# Patient Record
Sex: FEMALE | Race: WHITE | NOT HISPANIC OR LATINO | Employment: FULL TIME | URBAN - METROPOLITAN AREA
[De-identification: names, ages, dates, MRNs, and addresses within clinical notes are randomized per-mention and may not be internally consistent; named-entity substitution may affect disease eponyms.]

---

## 2019-02-15 ENCOUNTER — HOSPITAL ENCOUNTER (OUTPATIENT)
Facility: MEDICAL CENTER | Age: 49
End: 2019-02-15
Attending: PREVENTIVE MEDICINE
Payer: COMMERCIAL

## 2019-02-15 ENCOUNTER — EH NON-PROVIDER (OUTPATIENT)
Dept: OCCUPATIONAL MEDICINE | Facility: CLINIC | Age: 49
End: 2019-02-15

## 2019-02-15 ENCOUNTER — EMPLOYEE HEALTH (OUTPATIENT)
Dept: OCCUPATIONAL MEDICINE | Facility: CLINIC | Age: 49
End: 2019-02-15

## 2019-02-15 DIAGNOSIS — Z02.89 VISIT FOR OCCUPATIONAL HEALTH EXAMINATION: ICD-10-CM

## 2019-02-15 DIAGNOSIS — Z02.89 EXAMINATION, PHYSICAL, EMPLOYEE: ICD-10-CM

## 2019-02-15 DIAGNOSIS — Z02.89 VISIT FOR OCCUPATIONAL HEALTH EXAMINATION: Primary | ICD-10-CM

## 2019-02-15 LAB — VZV IGG SER IA-ACNC: 1.04

## 2019-02-15 PROCEDURE — 86480 TB TEST CELL IMMUN MEASURE: CPT | Performed by: PREVENTIVE MEDICINE

## 2019-02-15 PROCEDURE — 90707 MMR VACCINE SC: CPT | Performed by: PREVENTIVE MEDICINE

## 2019-02-15 PROCEDURE — 8915 PR COMPREHENSIVE PHYSICAL: Performed by: INTERNAL MEDICINE

## 2019-02-15 PROCEDURE — 86787 VARICELLA-ZOSTER ANTIBODY: CPT | Performed by: PREVENTIVE MEDICINE

## 2019-02-15 PROCEDURE — 94375 RESPIRATORY FLOW VOLUME LOOP: CPT | Performed by: PREVENTIVE MEDICINE

## 2019-02-15 PROCEDURE — 90715 TDAP VACCINE 7 YRS/> IM: CPT | Performed by: PREVENTIVE MEDICINE

## 2019-02-15 PROCEDURE — 94010 BREATHING CAPACITY TEST: CPT | Performed by: PREVENTIVE MEDICINE

## 2019-02-15 PROCEDURE — 90686 IIV4 VACC NO PRSV 0.5 ML IM: CPT | Performed by: PREVENTIVE MEDICINE

## 2019-02-15 NOTE — ADDENDUM NOTE
Addended by: WINDY WOODWARD on: 2/15/2019 10:08 AM     Modules accepted: Orders, Level of Service, SmartSet

## 2019-02-17 LAB
GAMMA INTERFERON BACKGROUND BLD IA-ACNC: 0.02 IU/ML
M TB IFN-G BLD-IMP: NEGATIVE
M TB IFN-G CD4+ BCKGRND COR BLD-ACNC: 0 IU/ML
MITOGEN IGNF BCKGRD COR BLD-ACNC: >10 IU/ML
QFT TB2 - NIL TBQ2: 0 IU/ML

## 2019-02-22 ENCOUNTER — EH NON-PROVIDER (OUTPATIENT)
Dept: OCCUPATIONAL MEDICINE | Facility: CLINIC | Age: 49
End: 2019-02-22

## 2019-02-22 DIAGNOSIS — Z71.85 IMMUNIZATION COUNSELING: ICD-10-CM

## 2019-10-23 ENCOUNTER — OFFICE VISIT (OUTPATIENT)
Dept: MEDICAL GROUP | Facility: MEDICAL CENTER | Age: 49
End: 2019-10-23
Payer: COMMERCIAL

## 2019-10-23 VITALS
OXYGEN SATURATION: 94 % | WEIGHT: 150 LBS | BODY MASS INDEX: 26.58 KG/M2 | SYSTOLIC BLOOD PRESSURE: 114 MMHG | HEART RATE: 92 BPM | RESPIRATION RATE: 16 BRPM | TEMPERATURE: 97.9 F | DIASTOLIC BLOOD PRESSURE: 80 MMHG | HEIGHT: 63 IN

## 2019-10-23 DIAGNOSIS — E78.6 LOW HDL (UNDER 40): ICD-10-CM

## 2019-10-23 DIAGNOSIS — Z12.39 SCREENING FOR BREAST CANCER: ICD-10-CM

## 2019-10-23 DIAGNOSIS — J45.40 MODERATE PERSISTENT ASTHMA WITHOUT COMPLICATION: ICD-10-CM

## 2019-10-23 DIAGNOSIS — Z13.29 THYROID DISORDER SCREEN: ICD-10-CM

## 2019-10-23 DIAGNOSIS — K21.9 GASTROESOPHAGEAL REFLUX DISEASE WITHOUT ESOPHAGITIS: ICD-10-CM

## 2019-10-23 PROCEDURE — 99203 OFFICE O/P NEW LOW 30 MIN: CPT | Performed by: NURSE PRACTITIONER

## 2019-10-23 RX ORDER — CETIRIZINE HYDROCHLORIDE 10 MG/1
10 TABLET ORAL EVERY EVENING
COMMUNITY

## 2019-10-23 RX ORDER — MONTELUKAST SODIUM 10 MG/1
10 TABLET ORAL DAILY
Qty: 90 TAB | Refills: 3 | Status: SHIPPED | OUTPATIENT
Start: 2019-10-23 | End: 2020-12-29 | Stop reason: SDUPTHER

## 2019-10-23 RX ORDER — MONTELUKAST SODIUM 10 MG/1
10 TABLET ORAL DAILY
COMMUNITY
End: 2019-10-23 | Stop reason: SDUPTHER

## 2019-10-23 SDOH — HEALTH STABILITY: MENTAL HEALTH: HOW OFTEN DO YOU HAVE A DRINK CONTAINING ALCOHOL?: NEVER

## 2019-10-23 ASSESSMENT — PATIENT HEALTH QUESTIONNAIRE - PHQ9: CLINICAL INTERPRETATION OF PHQ2 SCORE: 0

## 2019-10-23 NOTE — ASSESSMENT & PLAN NOTE
"\"since childhood\"   Had been prescribed omeprazole at one point, not taking regularly.  She did have dysphagia and worsening GERD when pregnant, had a scope about 13 years ago which was reportedly normal  She had an unofficial swallow study at one point about 10 years ago which did show motility abnormalities (works as RN)  Now still having regular symptoms as well as hoarse voice  She is taking nexium as needed, tums if still having symptoms.  She would like to follow-up with gastroenterology.  No chronic cough, nausea, dysphasia, abdominal pain  "
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
## 2019-11-15 ENCOUNTER — APPOINTMENT (RX ONLY)
Dept: URBAN - METROPOLITAN AREA CLINIC 20 | Facility: CLINIC | Age: 49
Setting detail: DERMATOLOGY
End: 2019-11-15

## 2019-11-15 DIAGNOSIS — Z71.89 OTHER SPECIFIED COUNSELING: ICD-10-CM

## 2019-11-15 DIAGNOSIS — L82.1 OTHER SEBORRHEIC KERATOSIS: ICD-10-CM

## 2019-11-15 DIAGNOSIS — L81.4 OTHER MELANIN HYPERPIGMENTATION: ICD-10-CM

## 2019-11-15 DIAGNOSIS — D22 MELANOCYTIC NEVI: ICD-10-CM

## 2019-11-15 DIAGNOSIS — D18.0 HEMANGIOMA: ICD-10-CM

## 2019-11-15 DIAGNOSIS — L73.8 OTHER SPECIFIED FOLLICULAR DISORDERS: ICD-10-CM

## 2019-11-15 DIAGNOSIS — L85.3 XEROSIS CUTIS: ICD-10-CM

## 2019-11-15 PROBLEM — D22.61 MELANOCYTIC NEVI OF RIGHT UPPER LIMB, INCLUDING SHOULDER: Status: ACTIVE | Noted: 2019-11-15

## 2019-11-15 PROBLEM — D22.39 MELANOCYTIC NEVI OF OTHER PARTS OF FACE: Status: ACTIVE | Noted: 2019-11-15

## 2019-11-15 PROBLEM — D18.01 HEMANGIOMA OF SKIN AND SUBCUTANEOUS TISSUE: Status: ACTIVE | Noted: 2019-11-15

## 2019-11-15 PROBLEM — D22.71 MELANOCYTIC NEVI OF RIGHT LOWER LIMB, INCLUDING HIP: Status: ACTIVE | Noted: 2019-11-15

## 2019-11-15 PROBLEM — D22.5 MELANOCYTIC NEVI OF TRUNK: Status: ACTIVE | Noted: 2019-11-15

## 2019-11-15 PROBLEM — D22.62 MELANOCYTIC NEVI OF LEFT UPPER LIMB, INCLUDING SHOULDER: Status: ACTIVE | Noted: 2019-11-15

## 2019-11-15 PROBLEM — J45.909 UNSPECIFIED ASTHMA, UNCOMPLICATED: Status: ACTIVE | Noted: 2019-11-15

## 2019-11-15 PROBLEM — D22.72 MELANOCYTIC NEVI OF LEFT LOWER LIMB, INCLUDING HIP: Status: ACTIVE | Noted: 2019-11-15

## 2019-11-15 PROCEDURE — ? SUNSCREEN RECOMMENDATIONS

## 2019-11-15 PROCEDURE — 99203 OFFICE O/P NEW LOW 30 MIN: CPT

## 2019-11-15 PROCEDURE — ? RECOMMENDATIONS

## 2019-11-15 PROCEDURE — ? COUNSELING

## 2019-11-15 PROCEDURE — ? OBSERVATION AND MEASURE

## 2019-11-15 ASSESSMENT — LOCATION DETAILED DESCRIPTION DERM
LOCATION DETAILED: LEFT DISTAL POSTERIOR THIGH
LOCATION DETAILED: LEFT SUPERIOR UPPER BACK
LOCATION DETAILED: RIGHT INFERIOR VERMILION LIP
LOCATION DETAILED: RIGHT INFERIOR VERMILION LIP
LOCATION DETAILED: LEFT VENTRAL PROXIMAL FOREARM
LOCATION DETAILED: RIGHT DISTAL POSTERIOR UPPER ARM
LOCATION DETAILED: RIGHT SUPERIOR UPPER BACK
LOCATION DETAILED: RIGHT DISTAL POSTERIOR THIGH
LOCATION DETAILED: RIGHT INFERIOR MEDIAL MIDBACK
LOCATION DETAILED: RIGHT VENTRAL PROXIMAL FOREARM
LOCATION DETAILED: RIGHT PROXIMAL PRETIBIAL REGION
LOCATION DETAILED: RIGHT INFERIOR MEDIAL UPPER BACK
LOCATION DETAILED: RIGHT INFERIOR FOREHEAD
LOCATION DETAILED: INFERIOR THORACIC SPINE
LOCATION DETAILED: LEFT MEDIAL UPPER BACK
LOCATION DETAILED: LEFT PROXIMAL POSTERIOR UPPER ARM
LOCATION DETAILED: LEFT LATERAL PROXIMAL PRETIBIAL REGION
LOCATION DETAILED: RIGHT INFERIOR CENTRAL MALAR CHEEK

## 2019-11-15 ASSESSMENT — LOCATION ZONE DERM
LOCATION ZONE: LIP
LOCATION ZONE: FACE
LOCATION ZONE: ARM
LOCATION ZONE: LIP
LOCATION ZONE: TRUNK
LOCATION ZONE: LEG

## 2019-11-15 ASSESSMENT — LOCATION SIMPLE DESCRIPTION DERM
LOCATION SIMPLE: UPPER BACK
LOCATION SIMPLE: LEFT FOREARM
LOCATION SIMPLE: RIGHT POSTERIOR THIGH
LOCATION SIMPLE: LEFT POSTERIOR THIGH
LOCATION SIMPLE: RIGHT LOWER BACK
LOCATION SIMPLE: RIGHT INFERIOR LIP
LOCATION SIMPLE: LEFT UPPER BACK
LOCATION SIMPLE: RIGHT INFERIOR LIP
LOCATION SIMPLE: RIGHT FOREARM
LOCATION SIMPLE: RIGHT CHEEK
LOCATION SIMPLE: LEFT POSTERIOR UPPER ARM
LOCATION SIMPLE: RIGHT POSTERIOR UPPER ARM
LOCATION SIMPLE: RIGHT FOREHEAD
LOCATION SIMPLE: LEFT PRETIBIAL REGION
LOCATION SIMPLE: RIGHT PRETIBIAL REGION
LOCATION SIMPLE: RIGHT UPPER BACK

## 2019-12-12 ENCOUNTER — HOSPITAL ENCOUNTER (OUTPATIENT)
Dept: RADIOLOGY | Facility: MEDICAL CENTER | Age: 49
End: 2019-12-12
Attending: NURSE PRACTITIONER
Payer: COMMERCIAL

## 2019-12-12 DIAGNOSIS — Z12.39 SCREENING FOR BREAST CANCER: ICD-10-CM

## 2019-12-12 PROCEDURE — 77067 SCR MAMMO BI INCL CAD: CPT

## 2020-03-11 ENCOUNTER — TELEPHONE (OUTPATIENT)
Dept: MEDICAL GROUP | Facility: MEDICAL CENTER | Age: 50
End: 2020-03-11

## 2020-03-11 DIAGNOSIS — J45.40 MODERATE PERSISTENT ASTHMA WITHOUT COMPLICATION: ICD-10-CM

## 2020-03-11 RX ORDER — ALBUTEROL SULFATE 90 UG/1
2 AEROSOL, METERED RESPIRATORY (INHALATION) EVERY 4 HOURS PRN
Qty: 1 INHALER | Refills: 5 | Status: SHIPPED | OUTPATIENT
Start: 2020-03-11 | End: 2020-03-17 | Stop reason: SDUPTHER

## 2020-03-11 NOTE — TELEPHONE ENCOUNTER
1. Caller Name: Jennifer Benton                            Call Back Number: [phone] (home)           How would the patient prefer to be contacted with a response: [employer] message    2. SPECIFIC Action To Be Taken: Referral pending, please sign.    3. Diagnosis/Clinical Reason for Request:     Patient request / for asthma     (patient used to see someone in [facility] and would like someone new in the area now)    4. Specialty & Provider Name/Lab/Imaging Location:     Pulmonology     5. Is appointment scheduled for requested order/referral: no    Patient was not informed they will receive a return phone call from the office ONLY if there are any questions before processing their request. Advised to call back if they haven't received a call from the referral department in 5 days.

## 2020-03-11 NOTE — TELEPHONE ENCOUNTER
Patient would like a prescription for PRO AIR for her asthma, I am not seeing this in the patient chart. Please review.      Received request via: Patient    Was the patient seen in the last year in this department? Yes    Does the patient have an active prescription (recently filled or refills available) for medication(s) requested? No

## 2020-03-17 ENCOUNTER — OFFICE VISIT (OUTPATIENT)
Dept: PULMONOLOGY | Facility: HOSPICE | Age: 50
End: 2020-03-17
Payer: COMMERCIAL

## 2020-03-17 VITALS
WEIGHT: 150 LBS | DIASTOLIC BLOOD PRESSURE: 62 MMHG | HEART RATE: 90 BPM | HEIGHT: 63 IN | OXYGEN SATURATION: 94 % | SYSTOLIC BLOOD PRESSURE: 126 MMHG | BODY MASS INDEX: 26.58 KG/M2

## 2020-03-17 DIAGNOSIS — K21.9 GASTROESOPHAGEAL REFLUX DISEASE WITHOUT ESOPHAGITIS: ICD-10-CM

## 2020-03-17 DIAGNOSIS — Z91.09 ENVIRONMENTAL ALLERGIES: ICD-10-CM

## 2020-03-17 DIAGNOSIS — J45.50 SEVERE PERSISTENT ASTHMA WITHOUT COMPLICATION: ICD-10-CM

## 2020-03-17 DIAGNOSIS — Z78.9 NONSMOKER: ICD-10-CM

## 2020-03-17 PROCEDURE — 99204 OFFICE O/P NEW MOD 45 MIN: CPT | Performed by: INTERNAL MEDICINE

## 2020-03-17 RX ORDER — PREDNISONE 10 MG/1
TABLET ORAL
Qty: 18 TAB | Refills: 0 | Status: SHIPPED | OUTPATIENT
Start: 2020-03-17 | End: 2020-12-10

## 2020-03-17 RX ORDER — IPRATROPIUM BROMIDE AND ALBUTEROL SULFATE 2.5; .5 MG/3ML; MG/3ML
3 SOLUTION RESPIRATORY (INHALATION) 4 TIMES DAILY
Qty: 60 BULLET | Refills: 3 | Status: SHIPPED | OUTPATIENT
Start: 2020-03-17 | End: 2021-04-15

## 2020-03-17 RX ORDER — ALBUTEROL SULFATE 90 UG/1
2 AEROSOL, METERED RESPIRATORY (INHALATION) EVERY 4 HOURS PRN
Qty: 1 INHALER | Refills: 5 | Status: SHIPPED | OUTPATIENT
Start: 2020-03-17 | End: 2021-03-02 | Stop reason: SDUPTHER

## 2020-03-17 NOTE — LETTER
March 17, 2020      To whom it may concern:      Re: Jennifer Benton is advised to maintain social distancing due to her pulmonary condition.  Consequently, she is advised to avoid any groups of greater than 10 people. If this cannot be accommodated at work then she is recommended to stay at home.                  Sincerely,        Dasia Arevalo M.D.
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
## 2020-03-24 ENCOUNTER — TELEPHONE (OUTPATIENT)
Dept: PULMONOLOGY | Facility: HOSPICE | Age: 50
End: 2020-03-24

## 2020-03-24 NOTE — TELEPHONE ENCOUNTER
Updated patient regarding medical equipment referral to [facility]; gave pt Sukhjinder phone#

## 2020-03-24 NOTE — TELEPHONE ENCOUNTER
1. Caller Name: Jennifer                        Call Back Number: [phone]  [facility] PCP or Specialty Provider: Yes         2.  Does patient have any active symptoms of respiratory illness (fever OR cough OR shortness of breath OR sore throat)? Yes, the patient reports the following respiratory symptoms: cough.  Denies fever    3.  Does patient have any comoribidities? None Asthma    4.  Has the patient traveled in the last 14 days OR had any known contact with someone who is suspected or confirmed to have COVID-19?  No.    5. Disposition: Advised to perform self care, monitor for worsening symptoms and to call back in 3 days if no improvement Pt works at [employer], was told yesterday that an unknown coworker tested positive for COVID19.  Was seen in clinic by Dr Arevalo on 3/17 and reports vigilant social distancing after that appointment.  Pt denies any fever, chills, or recent travel; reports typical sob and chest tightness associated with her lifelong asthma diagnosis; finishing prednisone taper today, reports adequate supply of asthma medications; reports new Rx for nebulizer albuterol however has never being given nebulizer machine, will f/u with that.  Educated patient regarding importance of self isolation, instructed to report to [facility] or [facility] if symptoms worsen and do not respond to prescribed medications; Dr Arevalo updated    Note routed to [facility] Provider: FYI only.

## 2020-03-24 NOTE — TELEPHONE ENCOUNTER
Caller: Jennifer    Phone Number: [phone] (home)     Message:Pt called and l/m. She was recently seen by Dr Arevalo.  She had exposure to the COVID-19 at her workplace.  She is under quarantine.  She would like Dr Arevalo to call her to let her know what she thinks and what she should do since she has asthma.

## 2020-04-28 ENCOUNTER — TELEMEDICINE (OUTPATIENT)
Dept: MEDICAL GROUP | Facility: MEDICAL CENTER | Age: 50
End: 2020-04-28
Payer: COMMERCIAL

## 2020-04-28 VITALS — WEIGHT: 148 LBS | HEIGHT: 62 IN | TEMPERATURE: 97.9 F | BODY MASS INDEX: 27.23 KG/M2

## 2020-04-28 DIAGNOSIS — U07.1 COVID-19 VIRUS DETECTED: ICD-10-CM

## 2020-04-28 PROCEDURE — 99213 OFFICE O/P EST LOW 20 MIN: CPT | Mod: 95,CR | Performed by: NURSE PRACTITIONER

## 2020-04-28 NOTE — PROGRESS NOTES
"Telemedicine Visit: Established Patient     This encounter was conducted via [employer].   Verbal consent was obtained. Patient's identity was verified.    Subjective:   CC: positive COVID 19  Jennifer Benton is a 49 y.o. female presenting for virtual visit to discuss COVID 19 concerns. She was exposed at the workplace ([employer], coworker passed away) in March. Her office was closed as a result and all employees were tested. Her test was positive around 3/24/2020. She had mild increase in asthma symptoms, body aches, congestion which lasted a few days. She states she has been mostly back to \"normal\" and feeling find since 3/31. She has been required to remain out of work until having a negative test. She will be tested tomorrow. She has an open workman's comp case and is seeing occupational health.   Today she is essentially wondering if she needs to do anything else to prevent spread of illness or prepare her for return to work    Current medicines (including changes today)  Current Outpatient Medications   Medication Sig Dispense Refill   • predniSONE (DELTASONE) 10 MG Tab Take 30mg x 3 days, then take 20mg x 3 days, then take 10mg x 3 days, with food, then discontinue. 18 Tab 0   • albuterol 108 (90 Base) MCG/ACT Aero Soln inhalation aerosol Inhale 2 Puffs by mouth every four hours as needed for Shortness of Breath. 1 Inhaler 5   • ipratropium-albuterol (DUONEB) 0.5-2.5 (3) MG/3ML nebulizer solution 3 mL by Nebulization route 4 times a day. 60 Bullet 3   • cetirizine (ZYRTEC ALLERGY) 10 MG Tab Take 10 mg by mouth every day.     • montelukast (SINGULAIR) 10 MG Tab Take 1 Tab by mouth every day. 90 Tab 3   • Mometasone Furo-Formoterol Fum (DULERA) 100-5 MCG/ACT Aerosol Inhale 1 Puff by mouth 2 Times a Day. 3 Inhaler 3     No current facility-administered medications for this visit.        Patient Active Problem List    Diagnosis Date Noted   • Gastroesophageal reflux disease without esophagitis 10/23/2019   • Moderate " "persistent asthma without complication 10/23/2019       Family History   Problem Relation Age of Onset   • Heart Disease Father 48   • Diabetes Son         type 1       She  has a past medical history of Hyperlipidemia.  She  has no past surgical history on file.       Objective:   Vitals obtained by patient:  Vitals:    04/28/20 1121   Temp: 36.6 °C (97.9 °F)     Vitals:    04/28/20 1121   Weight: 67.1 kg (148 lb)   Height: 1.575 m (5' 2\")       Physical Exam:  Constitutional: Alert, no distress, well-groomed.  Skin: No rashes in visible areas.  Eye: Round. Conjunctiva clear, lids normal. No icterus.   ENMT: Lips pink without lesions, good dentition, moist mucous membranes. Phonation normal.  Neck: No masses, no thyromegaly. Moves freely without pain.  Respiratory: Unlabored respiratory effort, no cough or audible wheeze  Psych: Alert and oriented x3, normal affect and mood.       Assessment and Plan:   The following treatment plan was discussed:     1. COVID-19 virus detected  positive test in late March d/t workplace exposure. She did have mild symptoms, now resolved. She needs retesting to return to work and will be doing this tomorrow. She will continue her usual allergy and asthma regimen and will contact me with further concerns      Follow-up: as needed         "

## 2020-10-17 NOTE — PROGRESS NOTES
Please refer to scanned documents     Problem: Respiratory  Intervention: Respiratory assessment  Note: BRONCHOSPASM/BRONCHOCONSTRICTION     [x]         IMPROVE AERATION/BREATH SOUNDS  [x]   ADMINISTER BRONCHODILATOR THERAPY AS APPROPRIATE  [x]   ASSESS BREATH SOUNDS  [x]   IMPLEMENT AEROSOL/MDI PROTOCOL  [x]   PATIENT EDUCATION AS NEEDED     PROVIDE ADEQUATE OXYGENATION WITH ACCEPTABLE SP02/ABG'S    [x]  IDENTIFY APPROPRIATE OXYGEN THERAPY  [x]   MONITOR SP02/ABG'S AS NEEDED   [x]   PATIENT EDUCATION AS NEEDED   NON INVASIVE VENTILATION  PROVIDE OPTIMAL VENTILATION/ACCEPTABLE SP02  IMPLEMENT NON INVASIVE VENTILATION PROTOCOL  ASSESSMENT SKIN INTEGRITY  PATIENT EDUCATION AS NEEDED  BIPAP AS NEEDED

## 2020-12-10 ENCOUNTER — TELEMEDICINE (OUTPATIENT)
Dept: MEDICAL GROUP | Facility: MEDICAL CENTER | Age: 50
End: 2020-12-10

## 2020-12-10 VITALS
DIASTOLIC BLOOD PRESSURE: 98 MMHG | WEIGHT: 145 LBS | HEIGHT: 62 IN | SYSTOLIC BLOOD PRESSURE: 127 MMHG | BODY MASS INDEX: 26.68 KG/M2

## 2020-12-10 DIAGNOSIS — I10 ESSENTIAL HYPERTENSION: ICD-10-CM

## 2020-12-10 PROCEDURE — 99214 OFFICE O/P EST MOD 30 MIN: CPT | Mod: 95,CR | Performed by: NURSE PRACTITIONER

## 2020-12-10 RX ORDER — LOSARTAN POTASSIUM 25 MG/1
25 TABLET ORAL DAILY
Qty: 30 TAB | Refills: 0 | Status: SHIPPED | OUTPATIENT
Start: 2020-12-10 | End: 2021-01-08 | Stop reason: SDUPTHER

## 2020-12-10 NOTE — NON-PROVIDER
Patient monitors bp daily for the past several weeks. She was able to share yesterdays bp of 132/102 on 12/09

## 2020-12-11 NOTE — ASSESSMENT & PLAN NOTE
This is a new issue first brought to her attention by the dentist a few months ago. 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 She is typically in the upper 130's over 90's or low 100's.   No recent weight gain, dietary changes. No tobacco or stimulant use. She is not getting much exercise recently d/t covid 19 pandemic.   She does have family h/o HTN  No frequent headaches, dizziness, palpitations, chest pain

## 2020-12-11 NOTE — PROGRESS NOTES
Telemedicine: Established Patient   This evaluation was conducted via [employer] using secure and encrypted videoconferencing technology. The patient was in a private location in the state of [place].    The patient's identity was confirmed and verbal consent was obtained for this virtual visit.    Subjective:   CC:   Chief Complaint   Patient presents with   • Hypertension     f/v       Jennifer Benton is a 50 y.o. female presenting for evaluation and management of:    Essential hypertension  This is a new issue first brought to her attention by the dentist a few months ago. 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 She is typically in the upper 130's over 90's or low 100's.   No recent weight gain, dietary changes. No tobacco or stimulant use. She is not getting much exercise recently d/t covid 19 pandemic.   She does have family h/o HTN  No frequent headaches, dizziness, palpitations, chest pain        ROS      No Known Allergies    Current medicines (including changes today)  Current Outpatient Medications   Medication Sig Dispense Refill   • losartan (COZAAR) 25 MG Tab Take 1 Tab by mouth every day. 30 Tab 0   • albuterol 108 (90 Base) MCG/ACT Aero Soln inhalation aerosol Inhale 2 Puffs by mouth every four hours as needed for Shortness of Breath. 1 Inhaler 5   • ipratropium-albuterol (DUONEB) 0.5-2.5 (3) MG/3ML nebulizer solution 3 mL by Nebulization route 4 times a day. 60 Bullet 3   • cetirizine (ZYRTEC ALLERGY) 10 MG Tab Take 10 mg by mouth every day.     • montelukast (SINGULAIR) 10 MG Tab Take 1 Tab by mouth every day. 90 Tab 3   • Mometasone Furo-Formoterol Fum (DULERA) 100-5 MCG/ACT Aerosol Inhale 1 Puff by mouth 2 Times a Day. 3 Inhaler 3     No current facility-administered medications for this visit.        Patient Active Problem List    Diagnosis Date Noted   • Essential hypertension 12/10/2020   • Gastroesophageal reflux disease without esophagitis 10/23/2019   •  "Moderate persistent asthma without complication 10/23/2019       Family History   Problem Relation Age of Onset   • Heart Disease Father 48   • Diabetes Son         type 1       She  has a past medical history of Hyperlipidemia.  She  has no past surgical history on file.       Objective:   /98   Ht 1.575 m (5' 2\")   Wt 65.8 kg (145 lb)   BMI 26.52 kg/m²     Physical Exam  Physical Exam:  Constitutional: Alert, no distress, well-groomed.  Skin: No rashes in visible areas.  Eye: Round. Conjunctiva clear, lids normal. No icterus.   ENMT: Lips pink without lesions, good dentition, moist mucous membranes. Phonation normal.  Neck: No masses, no thyromegaly. Moves freely without pain.  CV: Pulse as reported by patient  Respiratory: Unlabored respiratory effort, no cough or audible wheeze  Psych: Alert and oriented x3, normal affect and mood.     Assessment and Plan:   The following treatment plan was discussed:     1. Essential hypertension  - losartan (COZAAR) 25 MG Tab; Take 1 Tab by mouth every day.  Dispense: 30 Tab; Refill: 0  new problems over the last few months. She has been monitor readings and found them to be persistently elevated. We will start losartan. Discussed cardio exercise, DASH diet, continue avoidance of alcohol and tobacco, work on slight weight loss. She will contact me with readings in 1 week      Follow-up: 1 week           "

## 2020-12-29 DIAGNOSIS — J45.40 MODERATE PERSISTENT ASTHMA WITHOUT COMPLICATION: ICD-10-CM

## 2020-12-29 RX ORDER — MONTELUKAST SODIUM 10 MG/1
10 TABLET ORAL DAILY
Qty: 90 TAB | Refills: 3 | Status: SHIPPED | OUTPATIENT
Start: 2020-12-29 | End: 2022-05-10 | Stop reason: SDUPTHER

## 2021-01-04 ENCOUNTER — PATIENT MESSAGE (OUTPATIENT)
Dept: MEDICAL GROUP | Facility: MEDICAL CENTER | Age: 51
End: 2021-01-04

## 2021-01-04 DIAGNOSIS — I10 ESSENTIAL HYPERTENSION: ICD-10-CM

## 2021-01-08 RX ORDER — LOSARTAN POTASSIUM 25 MG/1
25 TABLET ORAL DAILY
Qty: 90 TAB | Refills: 1 | Status: SHIPPED | OUTPATIENT
Start: 2021-01-08 | End: 2021-07-27

## 2021-02-04 DIAGNOSIS — J45.40 MODERATE PERSISTENT ASTHMA WITHOUT COMPLICATION: ICD-10-CM

## 2021-02-04 NOTE — TELEPHONE ENCOUNTER
Received request via: Pharmacy    Was the patient seen in the last year in this department? Yes    Does the patient have an active prescription (recently filled or refills available) for medication(s) requested? No     Requested Prescriptions     Pending Prescriptions Disp Refills   • Mometasone Furo-Formoterol Fum (DULERA) 100-5 MCG/ACT Aerosol       Sig: Inhale 1 Puff 2 Times a Day.

## 2021-02-21 ENCOUNTER — PATIENT MESSAGE (OUTPATIENT)
Dept: MEDICAL GROUP | Facility: MEDICAL CENTER | Age: 51
End: 2021-02-21

## 2021-03-02 ENCOUNTER — TELEMEDICINE (OUTPATIENT)
Dept: MEDICAL GROUP | Facility: MEDICAL CENTER | Age: 51
End: 2021-03-02

## 2021-03-02 VITALS — WEIGHT: 151 LBS | HEIGHT: 62 IN | BODY MASS INDEX: 27.79 KG/M2

## 2021-03-02 DIAGNOSIS — I10 ESSENTIAL HYPERTENSION: ICD-10-CM

## 2021-03-02 DIAGNOSIS — L60.8 ACQUIRED DEFORMITY OF NAIL: ICD-10-CM

## 2021-03-02 DIAGNOSIS — Z12.31 ENCOUNTER FOR SCREENING MAMMOGRAM FOR MALIGNANT NEOPLASM OF BREAST: ICD-10-CM

## 2021-03-02 DIAGNOSIS — J45.50 SEVERE PERSISTENT ASTHMA WITHOUT COMPLICATION: ICD-10-CM

## 2021-03-02 PROCEDURE — 99213 OFFICE O/P EST LOW 20 MIN: CPT | Mod: 95,CR | Performed by: NURSE PRACTITIONER

## 2021-03-02 RX ORDER — HYDROCHLOROTHIAZIDE 12.5 MG/1
12.5 TABLET ORAL DAILY
Qty: 90 TABLET | Refills: 1 | Status: SHIPPED | OUTPATIENT
Start: 2021-03-02 | End: 2021-04-20

## 2021-03-02 RX ORDER — ALBUTEROL SULFATE 90 UG/1
2 AEROSOL, METERED RESPIRATORY (INHALATION) EVERY 4 HOURS PRN
Qty: 1 EACH | Refills: 5 | Status: SHIPPED | OUTPATIENT
Start: 2021-03-02

## 2021-03-02 ASSESSMENT — PATIENT HEALTH QUESTIONNAIRE - PHQ9: CLINICAL INTERPRETATION OF PHQ2 SCORE: 0

## 2021-03-02 NOTE — ASSESSMENT & PLAN NOTE
Stable on current medication regimen, needing refill of her albuterol today.  No increase in cough or shortness of breath

## 2021-03-02 NOTE — PROGRESS NOTES
"Telemedicine: Established Patient   This evaluation was conducted via Zoom using secure and encrypted videoconferencing technology. The patient was in a private location in the state of [place].    The patient's identity was confirmed and verbal consent was obtained for this virtual visit.    Subjective:   CC:   Chief Complaint   Patient presents with   • Follow-Up     BLOOD PRESSURE, FUNGUS ON NAIL       Jennifer Benton is a 50 y.o. female presenting for evaluation and management of:    Essential hypertension  Seen today for virtual visit to follow-up on new prescription for losartan 25 mg daily. Blood pressure readings have improved, now consistently in the 120s systolic. However, diastolic readings remain elevated typically in the low 90s. She recently stopped working night shift will be going back to her job at the [employer]. She is taking medication every morning. The job at the [employer] does tend to be sedentary so she is not sure she is going to get as much activity.  She is watching her diet and sodium intake. No tobacco use, no stimulant use.  No dizziness, fatigue, chest pain    Severe persistent asthma without complication  Stable on current medication regimen, needing refill of her albuterol today.  No increase in cough or shortness of breath    Acquired deformity of nail  Right thumb has developed a \"bubble like\" abnormality in the center of the nail, she questions what may be the cause.  She does not have any associated pain, no nail discoloration, thickening, breaking.  This is difficult to visualize today on her virtual visit.  She states that she does have divets in other nails, this is the only that has formed a lump        ROS      No Known Allergies    Current medicines (including changes today)  Current Outpatient Medications   Medication Sig Dispense Refill   • Esomeprazole Magnesium (NEXIUM) 10 MG Pack      • hydroCHLOROthiazide (HYDRODIURIL) 12.5 MG tablet Take 1 tablet by mouth every day. 90 tablet 1   • " "albuterol 108 (90 Base) MCG/ACT Aero Soln inhalation aerosol Inhale 2 Puffs every four hours as needed for Shortness of Breath. 1 Each 5   • Mometasone Furo-Formoterol Fum (DULERA) 100-5 MCG/ACT Aerosol Inhale 1 Puff 2 Times a Day. 39 g 3   • losartan (COZAAR) 25 MG Tab Take 1 Tab by mouth every day. 90 Tab 1   • montelukast (SINGULAIR) 10 MG Tab Take 1 Tab by mouth every day. 90 Tab 3   • ipratropium-albuterol (DUONEB) 0.5-2.5 (3) MG/3ML nebulizer solution 3 mL by Nebulization route 4 times a day. 60 Bullet 3   • cetirizine (ZYRTEC ALLERGY) 10 MG Tab Take 10 mg by mouth every day.       No current facility-administered medications for this visit.       Patient Active Problem List    Diagnosis Date Noted   • Acquired deformity of nail 03/02/2021   • Essential hypertension 12/10/2020   • Gastroesophageal reflux disease without esophagitis 10/23/2019   • Severe persistent asthma without complication 10/23/2019       Family History   Problem Relation Age of Onset   • Heart Disease Father 48   • Diabetes Son         type 1       She  has a past medical history of Hyperlipidemia.  She  has no past surgical history on file.       Objective:   Ht 1.575 m (5' 2\")   Wt 68.5 kg (151 lb)   BMI 27.62 kg/m²     Physical Exam    Assessment and Plan:   The following treatment plan was discussed:     1. Severe persistent asthma without complication  Stable  - albuterol 108 (90 Base) MCG/ACT Aero Soln inhalation aerosol; Inhale 2 Puffs every four hours as needed for Shortness of Breath.  Dispense: 1 Each; Refill: 5    2. Essential hypertension  Diastolic readings remain elevated.  We will continue present dose of losartan and add hydrochlorothiazide.  She will contact me with home readings  - hydroCHLOROthiazide (HYDRODIURIL) 12.5 MG tablet; Take 1 tablet by mouth every day.  Dispense: 90 tablet; Refill: 1    3. Encounter for screening mammogram for malignant neoplasm of breast  - [facility]-SCREENING MAMMO BILAT W/TOMOSYNTHESIS W/CAD; " Future    4. Acquired deformity of nail  Right thumb with possible subungal growth, no surrounding erythema or drainage, no associated pain, no hyperpigmentation.  I am uncertain what may do be the cause of this and canno ideally visualize it on virtual visit.  We will refer to dermatology for evaluation    Follow-up: 2 weeks with home blood pressure readings

## 2021-03-02 NOTE — ASSESSMENT & PLAN NOTE
"Right thumb has developed a \"bubble like\" abnormality in the center of the nail, she questions what may be the cause.  She does not have any associated pain, no nail discoloration, thickening, breaking.  This is difficult to visualize today on her virtual visit.  She states that she does have divets in other nails, this is the only that has formed a lump  "

## 2021-03-02 NOTE — ASSESSMENT & PLAN NOTE
Seen today for virtual visit to follow-up on new prescription for losartan 25 mg daily. Blood pressure readings have improved, now consistently in the 120s systolic. However, diastolic readings remain elevated typically in the low 90s. She recently stopped working night shift will be going back to her job at the [employer]. She is taking medication every morning. The job at the [employer] does tend to be sedentary so she is not sure she is going to get as much activity.  She is watching her diet and sodium intake. No tobacco use, no stimulant use.  No dizziness, fatigue, chest pain

## 2021-03-18 ENCOUNTER — APPOINTMENT (OUTPATIENT)
Dept: RADIOLOGY | Facility: MEDICAL CENTER | Age: 51
End: 2021-03-18
Attending: EMERGENCY MEDICINE
Payer: COMMERCIAL

## 2021-03-18 ENCOUNTER — HOSPITAL ENCOUNTER (EMERGENCY)
Facility: MEDICAL CENTER | Age: 51
End: 2021-03-18
Attending: EMERGENCY MEDICINE
Payer: COMMERCIAL

## 2021-03-18 VITALS
TEMPERATURE: 98.2 F | RESPIRATION RATE: 18 BRPM | HEART RATE: 95 BPM | HEIGHT: 62 IN | BODY MASS INDEX: 27.59 KG/M2 | WEIGHT: 149.91 LBS | SYSTOLIC BLOOD PRESSURE: 128 MMHG | OXYGEN SATURATION: 96 % | DIASTOLIC BLOOD PRESSURE: 110 MMHG

## 2021-03-18 DIAGNOSIS — S10.91XA ABRASION OF NECK, INITIAL ENCOUNTER: ICD-10-CM

## 2021-03-18 DIAGNOSIS — S50.11XA CONTUSION OF RIGHT FOREARM, INITIAL ENCOUNTER: ICD-10-CM

## 2021-03-18 DIAGNOSIS — S01.511A LIP LACERATION, INITIAL ENCOUNTER: ICD-10-CM

## 2021-03-18 DIAGNOSIS — S16.1XXA STRAIN OF NECK MUSCLE, INITIAL ENCOUNTER: ICD-10-CM

## 2021-03-18 PROCEDURE — 72125 CT NECK SPINE W/O DYE: CPT

## 2021-03-18 PROCEDURE — 99284 EMERGENCY DEPT VISIT MOD MDM: CPT

## 2021-03-18 RX ORDER — IBUPROFEN 600 MG/1
600 TABLET ORAL EVERY 6 HOURS PRN
Qty: 28 TABLET | Refills: 0 | Status: SHIPPED | OUTPATIENT
Start: 2021-03-18 | End: 2021-03-25

## 2021-03-18 RX ORDER — CYCLOBENZAPRINE HCL 10 MG
10 TABLET ORAL 3 TIMES DAILY PRN
Qty: 21 TABLET | Refills: 0 | Status: SHIPPED | OUTPATIENT
Start: 2021-03-18 | End: 2021-03-25

## 2021-03-18 RX ORDER — ACETAMINOPHEN 500 MG
500-1000 TABLET ORAL EVERY 6 HOURS PRN
Qty: 28 TABLET | Refills: 0 | Status: SHIPPED | OUTPATIENT
Start: 2021-03-18 | End: 2021-10-13
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
## 2021-03-19 NOTE — DISCHARGE INSTRUCTIONS
Keep in mind you will likely be more uncomfortable tomorrow; recommend no work tomorrow, rest, ice, elevation with regard to the contusion of your right arm.

## 2021-03-19 NOTE — ED NOTES
Pt given d/c paperwork, RX p/u information and f/u instruction, pt verbalized understanding all information given. Pt ambulated out of the ER w/o difficulty w/ family.
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
## 2021-04-15 ENCOUNTER — OFFICE VISIT (OUTPATIENT)
Dept: DERMATOLOGY | Facility: IMAGING CENTER | Age: 51
End: 2021-04-15
Payer: COMMERCIAL

## 2021-04-15 VITALS — BODY MASS INDEX: 27.23 KG/M2 | TEMPERATURE: 97.9 F | WEIGHT: 148 LBS | HEIGHT: 62 IN

## 2021-04-15 DIAGNOSIS — L60.4 BEAU'S LINES: ICD-10-CM

## 2021-04-15 PROCEDURE — 99202 OFFICE O/P NEW SF 15 MIN: CPT | Performed by: DERMATOLOGY

## 2021-04-16 NOTE — PROGRESS NOTES
DERMATOLOGY NOTE  NEW VISIT       Chief complaint: Establish Care and Skin Lesion     HPI/location: right thumbnail  Time present: 1 year  Painful lesion: No  Itching lesion: No  Enlarging lesion: No  Anything make it better or worse? No    History of skin cancer: No  History of precancers/actinic keratoses: No  History of biopsies:No  History of blistering/severe sunburns:Yes, Details: childhood  Family history of skin cancer:Yes, Details: father BCC  Family history of atypical moles:No    Past Medical History:   Diagnosis Date   • Hyperlipidemia         Family History   Problem Relation Age of Onset   • Heart Disease Father 48   • Diabetes Son         type 1        Social History     Socioeconomic History   • Marital status:      Spouse name: Not on file   • Number of children: Not on file   • Years of education: Not on file   • Highest education level: Not on file   Occupational History   • Not on file   Tobacco Use   • Smoking status: Never Smoker   • Smokeless tobacco: Never Used   Substance and Sexual Activity   • Alcohol use: Never   • Drug use: Never   • Sexual activity: Yes   Other Topics Concern   • Not on file   Social History Narrative   • Not on file     Social Determinants of Health     Financial Resource Strain:    • Difficulty of Paying Living Expenses:    Food Insecurity:    • Worried About Running Out of Food in the Last Year:    • Ran Out of Food in the Last Year:    Transportation Needs:    • Lack of Transportation (Medical):    • Lack of Transportation (Non-Medical):    Physical Activity:    • Days of Exercise per Week:    • Minutes of Exercise per Session:    Stress:    • Feeling of Stress :    Social Connections:    • Frequency of Communication with Friends and Family:    • Frequency of Social Gatherings with Friends and Family:    • Attends [employer] Services:    • Active Member of Clubs or Organizations:    • Attends Club or Organization Meetings:    • Marital Status:    Intimate  "Partner Violence:    • Fear of Current or Ex-Partner:    • Emotionally Abused:    • Physically Abused:    • Sexually Abused:         No Known Allergies     MEDICATIONS:  Medications relevant to specialty reviewed.    Current Outpatient Medications:   •  acetaminophen (TYLENOL) 500 MG Tab, Take 1-2 Tablets by mouth every 6 hours as needed for Mild Pain or Moderate Pain., Disp: 28 tablet, Rfl: 0  •  Esomeprazole Magnesium (NEXIUM) 10 MG Pack, , Disp: , Rfl:   •  hydroCHLOROthiazide (HYDRODIURIL) 12.5 MG tablet, Take 1 tablet by mouth every day., Disp: 90 tablet, Rfl: 1  •  albuterol 108 (90 Base) MCG/ACT Aero Soln inhalation aerosol, Inhale 2 Puffs every four hours as needed for Shortness of Breath., Disp: 1 Each, Rfl: 5  •  Mometasone Furo-Formoterol Fum (DULERA) 100-5 MCG/ACT Aerosol, Inhale 1 Puff 2 Times a Day., Disp: 39 g, Rfl: 3  •  losartan (COZAAR) 25 MG Tab, Take 1 Tab by mouth every day., Disp: 90 Tab, Rfl: 1  •  montelukast (SINGULAIR) 10 MG Tab, Take 1 Tab by mouth every day., Disp: 90 Tab, Rfl: 3  •  cetirizine (ZYRTEC ALLERGY) 10 MG Tab, Take 10 mg by mouth every day., Disp: , Rfl:   •  ipratropium-albuterol (DUONEB) 0.5-2.5 (3) MG/3ML nebulizer solution, 3 mL by Nebulization route 4 times a day. (Patient not taking: Reported on 4/15/2021), Disp: 60 Bullet, Rfl: 3     REVIEW OF SYSTEMS:   Positive for skin (see HPI)  Negative for fevers, chills and cough       EXAM:  Temp 36.6 °C (97.9 °F) (Temporal)   Ht 1.575 m (5' 2\")   Wt 67.1 kg (148 lb)   BMI 27.07 kg/m²   Constitutional: Well-developed, well-nourished, and in no distress.     A focused skin exam was performed including the affected areas of the hands and nails. Notable findings on exam today listed below and/or in assessment/plan.       IMPRESSION / PLAN:    1. Beau's lines  Exam: left thumb nail with 3 horizontal ridges  - benign appearing today, reassured  - likely due to injury to nail matrix (patient does a lot of gardening/working with her " hands) rather than systemic cause given only present on one nail  - no treatment indicated  - rtc with changes or worsening     Return to clinic in: Return as needed. and as needed for any new or changing skin lesions.    Neva Sierra M.D.

## 2021-04-20 ENCOUNTER — OFFICE VISIT (OUTPATIENT)
Dept: MEDICAL GROUP | Facility: MEDICAL CENTER | Age: 51
End: 2021-04-20
Payer: COMMERCIAL

## 2021-04-20 VITALS
HEIGHT: 63 IN | WEIGHT: 152.34 LBS | SYSTOLIC BLOOD PRESSURE: 130 MMHG | TEMPERATURE: 98.1 F | OXYGEN SATURATION: 98 % | BODY MASS INDEX: 26.99 KG/M2 | RESPIRATION RATE: 20 BRPM | DIASTOLIC BLOOD PRESSURE: 90 MMHG | HEART RATE: 88 BPM
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
## 2021-04-20 DIAGNOSIS — Z23 NEED FOR VACCINATION: ICD-10-CM

## 2021-04-20 DIAGNOSIS — J45.50 SEVERE PERSISTENT ASTHMA WITHOUT COMPLICATION: ICD-10-CM

## 2021-04-20 DIAGNOSIS — I10 ESSENTIAL HYPERTENSION: ICD-10-CM

## 2021-04-20 DIAGNOSIS — S19.9XXD INJURY OF NECK, SUBSEQUENT ENCOUNTER: ICD-10-CM

## 2021-04-20 PROBLEM — S19.9XXA NECK INJURY: Status: ACTIVE | Noted: 2021-04-20

## 2021-04-20 PROCEDURE — 90670 PCV13 VACCINE IM: CPT | Performed by: NURSE PRACTITIONER

## 2021-04-20 PROCEDURE — 99213 OFFICE O/P EST LOW 20 MIN: CPT | Mod: 25 | Performed by: NURSE PRACTITIONER

## 2021-04-20 PROCEDURE — 90471 IMMUNIZATION ADMIN: CPT | Performed by: NURSE PRACTITIONER

## 2021-04-20 RX ORDER — HYDROCHLOROTHIAZIDE 25 MG/1
25 TABLET ORAL DAILY
Qty: 90 TABLET | Refills: 1 | Status: SHIPPED | OUTPATIENT
Start: 2021-04-20 | End: 2021-11-01
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
## 2021-04-20 NOTE — ASSESSMENT & PLAN NOTE
Chronic issue managed with losartan 25 mg daily and hctz 12.5 mg daily  BP borderline in clinic and home readings. 90's diastolic.  No chest pain, dizziness, palpitations
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
## 2021-05-05 ENCOUNTER — APPOINTMENT (OUTPATIENT)
Dept: RADIOLOGY | Facility: MEDICAL CENTER | Age: 51
End: 2021-05-05
Attending: NURSE PRACTITIONER
Payer: COMMERCIAL

## 2021-07-27 DIAGNOSIS — I10 ESSENTIAL HYPERTENSION: ICD-10-CM

## 2021-07-27 RX ORDER — LOSARTAN POTASSIUM 25 MG/1
TABLET ORAL
Qty: 90 TABLET | Refills: 1 | Status: SHIPPED | OUTPATIENT
Start: 2021-07-27 | End: 2022-04-06 | Stop reason: SDUPTHER

## 2021-08-09 NOTE — PROGRESS NOTES
Called and spoke to patient to go over BP readings. Patient will take her BP tonight and send it through [employer]. Also I she has any questions or concerns we can be contacted at [phone]

## 2021-08-10 VITALS — SYSTOLIC BLOOD PRESSURE: 114 MMHG | DIASTOLIC BLOOD PRESSURE: 88 MMHG

## 2021-10-11 ENCOUNTER — TELEMEDICINE (OUTPATIENT)
Dept: MEDICAL GROUP | Facility: MEDICAL CENTER | Age: 51
End: 2021-10-11
Payer: COMMERCIAL

## 2021-10-11 VITALS
BODY MASS INDEX: 27.23 KG/M2 | DIASTOLIC BLOOD PRESSURE: 96 MMHG | HEIGHT: 62 IN | SYSTOLIC BLOOD PRESSURE: 119 MMHG | WEIGHT: 148 LBS

## 2021-10-11 DIAGNOSIS — F41.9 ANXIETY: ICD-10-CM

## 2021-10-11 DIAGNOSIS — Z13.29 SCREENING FOR THYROID DISORDER: ICD-10-CM

## 2021-10-11 DIAGNOSIS — I10 ESSENTIAL HYPERTENSION: ICD-10-CM

## 2021-10-11 DIAGNOSIS — Z13.220 LIPID SCREENING: ICD-10-CM

## 2021-10-11 DIAGNOSIS — Z13.21 ENCOUNTER FOR VITAMIN DEFICIENCY SCREENING: ICD-10-CM

## 2021-10-11 DIAGNOSIS — Z13.0 SCREENING FOR DEFICIENCY ANEMIA: ICD-10-CM

## 2021-10-11 PROBLEM — J32.4 CHRONIC PANSINUSITIS: Status: ACTIVE | Noted: 2021-10-11

## 2021-10-11 PROCEDURE — 99214 OFFICE O/P EST MOD 30 MIN: CPT | Mod: 95 | Performed by: NURSE PRACTITIONER
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
## 2021-10-11 NOTE — ASSESSMENT & PLAN NOTE
Systolic BP has improved but diastolic readings continue to be elevated, mainly in the 90's   Consistent with losartan 25 mg daily, hctz 25 mg daily  Feeling very stressed with work, frequently tearful and overwhelmed.  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  She is sometimes having chest tightness or discomfort when feeling particularly anxious, typically goes away when she is able to calm down.  We will have her schedule an office visit to determine if EKG is needed.  She is also interested in trial of SSRI to help deal with this stressful time    No SI/HI, history of manic behavior, substance abuse

## 2021-10-13 ENCOUNTER — APPOINTMENT (OUTPATIENT)
Dept: RADIOLOGY | Facility: MEDICAL CENTER | Age: 51
End: 2021-10-13
Attending: EMERGENCY MEDICINE
Payer: COMMERCIAL

## 2021-10-13 ENCOUNTER — HOSPITAL ENCOUNTER (EMERGENCY)
Facility: MEDICAL CENTER | Age: 51
End: 2021-10-13
Attending: EMERGENCY MEDICINE
Payer: COMMERCIAL

## 2021-10-13 VITALS
WEIGHT: 152.56 LBS | OXYGEN SATURATION: 94 % | BODY MASS INDEX: 28.07 KG/M2 | HEART RATE: 78 BPM | RESPIRATION RATE: 19 BRPM | DIASTOLIC BLOOD PRESSURE: 98 MMHG | TEMPERATURE: 98 F | SYSTOLIC BLOOD PRESSURE: 127 MMHG | HEIGHT: 62 IN

## 2021-10-13 DIAGNOSIS — R07.9 CHEST PAIN, UNSPECIFIED TYPE: ICD-10-CM

## 2021-10-13 LAB
ALBUMIN SERPL BCP-MCNC: 4.1 G/DL (ref 3.2–4.9)
ALBUMIN/GLOB SERPL: 1.4 G/DL
ALP SERPL-CCNC: 79 U/L (ref 30–99)
ALT SERPL-CCNC: 20 U/L (ref 2–50)
ANION GAP SERPL CALC-SCNC: 15 MMOL/L (ref 7–16)
AST SERPL-CCNC: 17 U/L (ref 12–45)
BASOPHILS # BLD AUTO: 0.8 % (ref 0–1.8)
BASOPHILS # BLD: 0.05 K/UL (ref 0–0.12)
BILIRUB SERPL-MCNC: 1.7 MG/DL (ref 0.1–1.5)
BUN SERPL-MCNC: 10 MG/DL (ref 8–22)
CALCIUM SERPL-MCNC: 9.3 MG/DL (ref 8.4–10.2)
CHLORIDE SERPL-SCNC: 98 MMOL/L (ref 96–112)
CO2 SERPL-SCNC: 21 MMOL/L (ref 20–33)
CREAT SERPL-MCNC: 0.57 MG/DL (ref 0.5–1.4)
EKG IMPRESSION: NORMAL
EOSINOPHIL # BLD AUTO: 0.21 K/UL (ref 0–0.51)
EOSINOPHIL NFR BLD: 3.3 % (ref 0–6.9)
ERYTHROCYTE [DISTWIDTH] IN BLOOD BY AUTOMATED COUNT: 40.2 FL (ref 35.9–50)
GLOBULIN SER CALC-MCNC: 3 G/DL (ref 1.9–3.5)
GLUCOSE SERPL-MCNC: 101 MG/DL (ref 65–99)
HCT VFR BLD AUTO: 43.1 % (ref 37–47)
HGB BLD-MCNC: 15.2 G/DL (ref 12–16)
IMM GRANULOCYTES # BLD AUTO: 0.04 K/UL (ref 0–0.11)
IMM GRANULOCYTES NFR BLD AUTO: 0.6 % (ref 0–0.9)
LYMPHOCYTES # BLD AUTO: 2.21 K/UL (ref 1–4.8)
LYMPHOCYTES NFR BLD: 34.7 % (ref 22–41)
MCH RBC QN AUTO: 31.5 PG (ref 27–33)
MCHC RBC AUTO-ENTMCNC: 35.3 G/DL (ref 33.6–35)
MCV RBC AUTO: 89.4 FL (ref 81.4–97.8)
MONOCYTES # BLD AUTO: 0.55 K/UL (ref 0–0.85)
MONOCYTES NFR BLD AUTO: 8.6 % (ref 0–13.4)
NEUTROPHILS # BLD AUTO: 3.31 K/UL (ref 2–7.15)
NEUTROPHILS NFR BLD: 52 % (ref 44–72)
NRBC # BLD AUTO: 0 K/UL
NRBC BLD-RTO: 0 /100 WBC
PLATELET # BLD AUTO: 312 K/UL (ref 164–446)
PMV BLD AUTO: 8 FL (ref 9–12.9)
POTASSIUM SERPL-SCNC: 3.5 MMOL/L (ref 3.6–5.5)
PROT SERPL-MCNC: 7.1 G/DL (ref 6–8.2)
RBC # BLD AUTO: 4.82 M/UL (ref 4.2–5.4)
SODIUM SERPL-SCNC: 134 MMOL/L (ref 135–145)
T4 FREE SERPL-MCNC: 1.58 NG/DL (ref 0.93–1.7)
TROPONIN T SERPL-MCNC: <6 NG/L (ref 6–19)
TSH SERPL DL<=0.005 MIU/L-ACNC: 0.47 UIU/ML (ref 0.38–5.33)
WBC # BLD AUTO: 6.4 K/UL (ref 4.8–10.8)

## 2021-10-13 PROCEDURE — 93005 ELECTROCARDIOGRAM TRACING: CPT

## 2021-10-13 PROCEDURE — 700102 HCHG RX REV CODE 250 W/ 637 OVERRIDE(OP): Performed by: EMERGENCY MEDICINE

## 2021-10-13 PROCEDURE — 80053 COMPREHEN METABOLIC PANEL: CPT

## 2021-10-13 PROCEDURE — 85025 COMPLETE CBC W/AUTO DIFF WBC: CPT

## 2021-10-13 PROCEDURE — 93005 ELECTROCARDIOGRAM TRACING: CPT | Performed by: EMERGENCY MEDICINE

## 2021-10-13 PROCEDURE — 71045 X-RAY EXAM CHEST 1 VIEW: CPT

## 2021-10-13 PROCEDURE — 84484 ASSAY OF TROPONIN QUANT: CPT

## 2021-10-13 PROCEDURE — 99283 EMERGENCY DEPT VISIT LOW MDM: CPT

## 2021-10-13 PROCEDURE — 84443 ASSAY THYROID STIM HORMONE: CPT

## 2021-10-13 PROCEDURE — 84439 ASSAY OF FREE THYROXINE: CPT

## 2021-10-13 PROCEDURE — A9270 NON-COVERED ITEM OR SERVICE: HCPCS | Performed by: EMERGENCY MEDICINE

## 2021-10-13 RX ORDER — IBUPROFEN 200 MG
800 TABLET ORAL EVERY 6 HOURS PRN
COMMUNITY

## 2021-10-13 RX ORDER — ASPIRIN 81 MG/1
324 TABLET, CHEWABLE ORAL ONCE
Status: COMPLETED | OUTPATIENT
Start: 2021-10-13 | End: 2021-10-13

## 2021-10-13 RX ADMIN — ASPIRIN 81 MG CHEWABLE TABLET 324 MG: 81 TABLET CHEWABLE at 10:53

## 2021-10-13 NOTE — ED TRIAGE NOTES
Chief Complaint   Patient presents with   • Chest Pain   Left chest pain that this pt thinks is related to her BP, recent change of medications. Points to her left chest, non radiating and no history. Taken in triage for an EKG. Saw PCP Monday for BP.

## 2021-10-13 NOTE — Clinical Note
Jennifer Benton was seen and treated in our emergency department on 10/13/2021.  She may return to work on 10/16/2021.       If you have any questions or concerns, please don't hesitate to call.      Dash Bell M.D.

## 2021-10-13 NOTE — ED NOTES
Med rec updated and complete  Allergies reviewed  Pt reports that her doctor told her to start taking her HCTZ 25MG 2 tablet Qday on 10/11/2021  Pt reports no antibiotics in the last 30 days.    No current facility-administered medications on file prior to encounter.     Current Outpatient Medications on File Prior to Encounter   Medication Sig Dispense Refill   • ibuprofen (MOTRIN) 200 MG Tab Take 800 mg by mouth every 6 hours as needed for Mild Pain.     • sertraline (ZOLOFT) 50 MG Tab Take 1 Tablet by mouth every day. (Patient taking differently: Take 50 mg by mouth every evening.) 30 Tablet 1   • losartan (COZAAR) 25 MG Tab TAKE 1 TABLET BY MOUTH EVERY DAY (Patient taking differently: Take 25 mg by mouth every evening.) 90 tablet 1   • hydroCHLOROthiazide (HYDRODIURIL) 25 MG Tab Take 1 tablet by mouth every day. (Patient taking differently: Take 25 mg by mouth every day. Per pt, doctor told her to take 50MG starting on 10/11/2021) 90 tablet 1   • esomeprazole (NEXIUM) 20 MG capsule Take 20 mg by mouth every evening.     • albuterol 108 (90 Base) MCG/ACT Aero Soln inhalation aerosol Inhale 2 Puffs every four hours as needed for Shortness of Breath. 1 Each 5   • Mometasone Furo-Formoterol Fum (DULERA) 100-5 MCG/ACT Aerosol Inhale 1 Puff 2 Times a Day. 39 g 3   • montelukast (SINGULAIR) 10 MG Tab Take 1 Tab by mouth every day. (Patient taking differently: Take 10 mg by mouth every evening.) 90 Tab 3   • cetirizine (ZYRTEC ALLERGY) 10 MG Tab Take 10 mg by mouth every evening.

## 2021-10-13 NOTE — ED NOTES
Discharged in good condition with follow up instructions, pt verbalizes understanding of all, ambulates out with steady gait accompanied by self.      Recorded Pressure: Ao, HR=58, Condition=1 (Aorta) Ao 123/60/88

## 2021-10-20 ENCOUNTER — PATIENT MESSAGE (OUTPATIENT)
Dept: MEDICAL GROUP | Facility: MEDICAL CENTER | Age: 51
End: 2021-10-20

## 2021-11-01 RX ORDER — HYDROCHLOROTHIAZIDE 25 MG/1
25 TABLET ORAL DAILY
Qty: 90 TABLET | Refills: 1 | Status: SHIPPED | OUTPATIENT
Start: 2021-11-01 | End: 2022-05-10

## 2021-11-02 DIAGNOSIS — F41.9 ANXIETY: ICD-10-CM

## 2021-11-11 ENCOUNTER — OFFICE VISIT (OUTPATIENT)
Dept: MEDICAL GROUP | Facility: MEDICAL CENTER | Age: 51
End: 2021-11-11
Payer: COMMERCIAL

## 2021-11-11 VITALS
WEIGHT: 151.68 LBS | HEART RATE: 84 BPM | HEIGHT: 62 IN | SYSTOLIC BLOOD PRESSURE: 104 MMHG | TEMPERATURE: 97.1 F | RESPIRATION RATE: 16 BRPM | BODY MASS INDEX: 27.91 KG/M2 | DIASTOLIC BLOOD PRESSURE: 74 MMHG | OXYGEN SATURATION: 93 %

## 2021-11-11 DIAGNOSIS — I10 ESSENTIAL HYPERTENSION: ICD-10-CM

## 2021-11-11 DIAGNOSIS — F32.A ANXIETY AND DEPRESSION: ICD-10-CM

## 2021-11-11 DIAGNOSIS — J45.50 SEVERE PERSISTENT ASTHMA WITHOUT COMPLICATION: ICD-10-CM

## 2021-11-11 DIAGNOSIS — Z23 NEED FOR VACCINATION: ICD-10-CM

## 2021-11-11 DIAGNOSIS — F41.9 ANXIETY AND DEPRESSION: ICD-10-CM

## 2021-11-11 PROCEDURE — 90732 PPSV23 VACC 2 YRS+ SUBQ/IM: CPT | Performed by: NURSE PRACTITIONER

## 2021-11-11 PROCEDURE — 99213 OFFICE O/P EST LOW 20 MIN: CPT | Mod: 25 | Performed by: NURSE PRACTITIONER

## 2021-11-11 PROCEDURE — 90471 IMMUNIZATION ADMIN: CPT | Performed by: NURSE PRACTITIONER

## 2021-11-11 ASSESSMENT — FIBROSIS 4 INDEX: FIB4 SCORE: 0.6

## 2021-11-11 NOTE — PROGRESS NOTES
CARDIOLOGY CONSULTATION NOTE      Date of Consultation: 2021  Primary Care Provider: SHREE Fernandes    Patient Name: Jennifer Benton  YOB: 1970  MRN: [id number]     Reason for Consultation:   Chest pain     Patient Story:   Jennifer Benton is a 51 year-old woman with a past medical history of GERD, asthma, hypertension, anxiety, and depression. Briefly, she presented emergency department on 10/13/2021 with episodes of chest pain during stress.  She had unremarkable work-up emergency department and was referred for Cardiology evaluation as an outpatient.    She presents today for consultation.  She notes that she was very stressed at work when she began to experience a fluttering and sharp pain over her left chest, which is what brought her to the emergency department.  She has had similar pains in the past when she was significantly stressed as well.  However, she is active with walking her dogs and is able to go up hills and stairs without similar type chest pains.  Outside stressful situations, she occasionally has a brief pinpoint pain over her chest, but does not experience more prolonged episodes of chest pain.  She was recently started on sertraline by her PCP and she has noticed since starting this her stress and anxiety levels have improved and she is no longer having chest discomfort.  She otherwise denies a personal history of cardiac disease and is a non-smoker.  She does, however, have a significant family history of coronary artery disease.  Her father had a heart attack at age 48 and her paternal grandfather  suddenly at age 32.  She also has 2 male cousins who suffered heart attacks in their 40s.  She notes that her home blood pressures are typically 120s/80s, although her blood pressure will increase when she is anxious or stressed.     Medications and Allergies:     Current Outpatient Medications   Medication Sig Dispense Refill   • sertraline (ZOLOFT)  50 MG Tab Take 1 Tablet by mouth every evening. 30 Tablet 5   • hydroCHLOROthiazide (HYDRODIURIL) 25 MG Tab TAKE 1 TABLET BY MOUTH EVERY DAY 90 Tablet 1   • ibuprofen (MOTRIN) 200 MG Tab Take 800 mg by mouth every 6 hours as needed for Mild Pain.     • losartan (COZAAR) 25 MG Tab TAKE 1 TABLET BY MOUTH EVERY DAY 90 tablet 1   • esomeprazole (NEXIUM) 20 MG capsule Take 20 mg by mouth every evening.     • albuterol 108 (90 Base) MCG/ACT Aero Soln inhalation aerosol Inhale 2 Puffs every four hours as needed for Shortness of Breath. 1 Each 5   • Mometasone Furo-Formoterol Fum (DULERA) 100-5 MCG/ACT Aerosol Inhale 1 Puff 2 Times a Day. 39 g 3   • montelukast (SINGULAIR) 10 MG Tab Take 1 Tab by mouth every day. 90 Tab 3   • cetirizine (ZYRTEC ALLERGY) 10 MG Tab Take 10 mg by mouth every evening.       No current facility-administered medications for this visit.       Allergies   Allergen Reactions   • Other Food      Walnuts, pt reports that her tongue burns           Medical Decision Making:   # Chest pain: Her chest pain was associated with stress and has improved with initiation of anxiolytic therapy.  She otherwise has not had exertional chest pain.  Given that her chest pain is improving and she is not having exertional component, would not pursue a further testing at this time.  However, if she develops more concerning chest pain, then she should have an evaluation, most likely with a CT coronary angiogram.  -See below for risk factor management    # Hypertension: She reports reasonably good control at home.  -Continue HCTZ 25 mg daily  -Continue losartan 25 mg daily    # Family history of early coronary artery disease: Given her family history of significant coronary artery disease and her personal history of relatively early onset hypertension, I did recommend further risk stratification with fasting lipids and a coronary artery calcium score.  -Fasting lipids  -Coronary artery calcium score  -Healthy  exercise  "and dietary recommendations were provided    Follow Up  1 month     Cardiac Studies and Procedures:   Electrophysiology  ECG (10/13/2021)-independently interpreted  Normal sinus rhythm, within normal limits     Vital Signs:   Vital Signs  /96 (BP Location: Left arm, Patient Position: Sitting)   Pulse 84   Ht 1.588 m (5' 2.5\")   Wt 68.5 kg (151 lb)   SpO2 94%    BP Readings from Last 4 Encounters:   11/17/21 136/96   11/11/21 104/74   10/13/21 127/98   10/11/21 119/96     Wt Readings from Last 4 Encounters:   11/17/21 68.5 kg (151 lb)   11/11/21 68.8 kg (151 lb 10.8 oz)   10/13/21 69.2 kg (152 lb 8.9 oz)   10/11/21 67.1 kg (148 lb)     Body mass index is 27.18 kg/m².     Laboratories:   Lipids  None available for review    GFR  Lab Results   Component Value Date/Time    IFNOTAFR >60 10/13/2021 1030     Lab Results   Component Value Date/Time    IFAFRICA >60 10/13/2021 1030       Chemistries  Lab Results   Component Value Date/Time    CREATININE 0.57 10/13/2021 1030     Lab Results   Component Value Date/Time    BUN 10 10/13/2021 1030     Lab Results   Component Value Date/Time    POTASSIUM 3.5 (L) 10/13/2021 1030     Lab Results   Component Value Date/Time    SODIUM 134 (L) 10/13/2021 1030     Lab Results   Component Value Date/Time    GLUCOSE 101 (H) 10/13/2021 1030     Lab Results   Component Value Date/Time    ASTSGOT 17 10/13/2021 1030     Lab Results   Component Value Date/Time    ALTSGPT 20 10/13/2021 1030     Lab Results   Component Value Date/Time    ALKPHOSPHAT 79 10/13/2021 1030     Lab Results   Component Value Date/Time    TROPONINT <6 10/13/2021 1030     TSH   Date Value Ref Range Status   10/13/2021 0.466 0.380 - 5.330 uIU/mL Final     Comment:     Please note new reference ranges effective 12/19/2017 12:30 PM    Pregnant Females, 1st Trimester  0.050-3.700  Pregnant Females, 2nd Trimester  0.310-4.350  Pregnant Females, 3rd Trimester  0.410-5.180         Blood Counts  Lab Results   Component " Value Date/Time    HEMOGLOBIN 15.2 10/13/2021 1030     Lab Results   Component Value Date/Time    PLATELETCT 312 10/13/2021 1030     Lab Results   Component Value Date/Time    WBC 6.4 10/13/2021 1030        Physical Examination:   General: Well-appearing, no acute distress  Eyes: Extraocular movements intact, anicteric  Ears: No ear lobe crease  Neck: Supple, full range of motion, no jugular venous distension  Pulmonary: Normal respiratory effort, clear to auscultation bilaterally  Cardiovascular: Regular rate and rhythm, no murmurs or gallops appreciated  Gastrointestinal: Soft, non-tender, non-distended  Extremities: Warm and well perfused, no lower extremity edema  Neurological: Alert and oriented, no gross focal motor deficits     Past History:   Past Medical History  The patient's past medical history was reviewed.  See HPI and self-reported patient medical history form for pertinent medical history to consultation.    Past Social History  The patient's social history was reviewed.  See [facility] self-reported patient medical history form for pertinent social history to consultation.    Past Social History  The patient's family history was reviewed.  See [facility] self-reported patient medical history form for pertinent family history to consultation.    Review of Systems  A pertinent cardiac review of systems was performed and was otherwise unremarkable except as per HPI and self-reported patient medical history form.        Brady Soler MD, [facility]  Interventional Cardiology  [facility], [facility]  [address] [address]  Phone: [phone]  Fax: [phone]

## 2021-11-11 NOTE — ASSESSMENT & PLAN NOTE
BP now controlled on losartan 25 mg daily, hctz 50 mg daily   She did have one episode of chest pain, was seen in ER with negative cardiac work-up.  She does have a consultation coming up with cardiology.  At this time she feels that it was likely related to her anxiety which is now improved  No further chest pain, palpitation, dizziness

## 2021-11-11 NOTE — PROGRESS NOTES
Subjective:     Chief Complaint   Patient presents with   • Follow-Up     bp     Jennifer Benton is a 51 y.o. female here today to follow up on:    Essential hypertension  BP now controlled on losartan 25 mg daily, hctz 50 mg daily   She did have one episode of chest pain, was seen in ER with negative cardiac work-up.  She does have a consultation coming up with cardiology.  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  She started on trial of sertraline 50 mg daily about a month ago and reports that this is helping significantly.  She is feeling much better, less tearful and overwhelmed, no acute episodes of anxiety.  Appetite is good  No SI/HI, history of manic behavior, no substance abuse       Current medicines (including changes today)  Current Outpatient Medications   Medication Sig Dispense Refill   • sertraline (ZOLOFT) 50 MG Tab Take 1 Tablet by mouth every evening. 30 Tablet 5   • hydroCHLOROthiazide (HYDRODIURIL) 25 MG Tab TAKE 1 TABLET BY MOUTH EVERY DAY 90 Tablet 1   • ibuprofen (MOTRIN) 200 MG Tab Take 800 mg by mouth every 6 hours as needed for Mild Pain.     • losartan (COZAAR) 25 MG Tab TAKE 1 TABLET BY MOUTH EVERY DAY (Patient taking differently: Take 25 mg by mouth every evening.) 90 tablet 1   • esomeprazole (NEXIUM) 20 MG capsule Take 20 mg by mouth every evening.     • albuterol 108 (90 Base) MCG/ACT Aero Soln inhalation aerosol Inhale 2 Puffs every four hours as needed for Shortness of Breath. 1 Each 5   • Mometasone Furo-Formoterol Fum (DULERA) 100-5 MCG/ACT Aerosol Inhale 1 Puff 2 Times a Day. 39 g 3   •  "montelukast (SINGULAIR) 10 MG Tab Take 1 Tab by mouth every day. (Patient taking differently: Take 10 mg by mouth every evening.) 90 Tab 3   • cetirizine (ZYRTEC ALLERGY) 10 MG Tab Take 10 mg by mouth every evening.       No current facility-administered medications for this visit.     She  has a past medical history of Hyperlipidemia.    ROS included above     Objective:     /74 (BP Location: Right arm, Patient Position: Sitting, BP Cuff Size: Adult)   Pulse 84   Temp 36.2 °C (97.1 °F) (Temporal)   Resp 16   Ht 1.575 m (5' 2\")   Wt 68.8 kg (151 lb 10.8 oz)   SpO2 93%  Body mass index is 27.74 kg/m².     Physical Exam:  General: Alert, oriented in no acute distress.  Eye contact is good, speech is normal, affect calm  Lungs: clear to auscultation bilaterally, normal effort, no wheeze/ rhonchi/ rales.  CV: regular rate and rhythm, S1, S2, no murmur  Abdomen: soft, nontender  Ext: no edema, color normal, vascularity normal, temperature normal    Assessment and Plan:   The following treatment plan was discussed   1. Essential hypertension   blood pressure is now well controlled on current medication, we will continue with this plan.  Appointment next week with cardiology   2. Severe persistent asthma without complication   stable   3. Anxiety and depression   improved with sertraline, will continue with 50 mg daily.   4. Need for vaccination  I have placed the below orders and discussed them with an approved delegating provider. The MA is performing the below orders under the direction of Dr. Calixto  Pneumococal Polysaccharide Vaccine 23-Valent =>1YO SQ/IM       Followup: Annually, sooner as needed         Please note that this dictation was created using voice recognition software. I have worked with consultants from the vendor as well as technical experts from [employer] to optimize the interface. I have made every reasonable attempt to correct obvious errors, but I expect that there are errors of " grammar and possibly content that I did not discover before finalizing the note.

## 2021-11-11 NOTE — ASSESSMENT & PLAN NOTE
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  She started on trial of sertraline 50 mg daily about a month ago and reports that this is helping significantly.  She is feeling much better, less tearful and overwhelmed, no acute episodes of anxiety.  Appetite is good  No SI/HI, history of manic behavior, no substance abuse

## 2021-11-17 ENCOUNTER — OFFICE VISIT (OUTPATIENT)
Dept: CARDIOLOGY | Facility: MEDICAL CENTER | Age: 51
End: 2021-11-17
Payer: COMMERCIAL

## 2021-11-17 VITALS
BODY MASS INDEX: 26.75 KG/M2 | HEART RATE: 84 BPM | SYSTOLIC BLOOD PRESSURE: 136 MMHG | WEIGHT: 151 LBS | OXYGEN SATURATION: 94 % | DIASTOLIC BLOOD PRESSURE: 96 MMHG | HEIGHT: 63 IN

## 2021-11-17 DIAGNOSIS — Z82.49 FAMILY HISTORY OF EARLY CAD: ICD-10-CM

## 2021-11-17 DIAGNOSIS — I10 ESSENTIAL HYPERTENSION: ICD-10-CM

## 2021-11-17 DIAGNOSIS — R07.89 OTHER CHEST PAIN: ICD-10-CM

## 2021-11-17 PROCEDURE — 99204 OFFICE O/P NEW MOD 45 MIN: CPT | Performed by: INTERNAL MEDICINE

## 2021-11-17 ASSESSMENT — FIBROSIS 4 INDEX: FIB4 SCORE: 0.6

## 2021-11-17 NOTE — PATIENT INSTRUCTIONS
Medical Plan:  1. Schedule coronary calcium score  2. Have fasting lipids done prior to follow up  3. Follow up in 1 month    Heart Healthy Lifestyle Recommendations:  1. Perform at least 150 min of moderate-intensity or 75 min of vigorous-intensity aerobic exercise per week  2. Eliminate nearly all sodas and diet sodas from diet  3. Eliminate nearly all artifical sweeteners from diet  4. Eliminate nearly all processed meats from diet  5. Reduce intake of sugary foods, simple carbohydrates (including breads, tortillas, and other baked goods), fried foods, and ultra-processed foods (most foods out of a package)  6. Moderate intake of cheese, eggs, and fresh cuts of land animal meats  7. Focus on consuming a real food diet including fresh vegetables fruits, unsalted nuts, legumes, whole grains, healthy oils (olive, avocado, canola, flaxseed, grapeseed), and seafood (particularly fish)

## 2021-12-01 ENCOUNTER — APPOINTMENT (OUTPATIENT)
Dept: MEDICAL GROUP | Facility: MEDICAL CENTER | Age: 51
End: 2021-12-01
Payer: COMMERCIAL

## 2021-12-22 ENCOUNTER — HOSPITAL ENCOUNTER (OUTPATIENT)
Dept: RADIOLOGY | Facility: MEDICAL CENTER | Age: 51
End: 2021-12-22
Attending: NURSE PRACTITIONER
Payer: COMMERCIAL

## 2021-12-22 DIAGNOSIS — Z12.31 ENCOUNTER FOR SCREENING MAMMOGRAM FOR MALIGNANT NEOPLASM OF BREAST: ICD-10-CM

## 2021-12-22 PROCEDURE — 77063 BREAST TOMOSYNTHESIS BI: CPT

## 2021-12-27 ENCOUNTER — TELEPHONE (OUTPATIENT)
Dept: CARDIOLOGY | Facility: MEDICAL CENTER | Age: 51
End: 2021-12-27

## 2021-12-27 NOTE — TELEPHONE ENCOUNTER
No lipid panel results in chart. Tried to call patient. [phone] voicemail is full. Unable to confirm if patient did labs or not.

## 2022-01-21 NOTE — ED PROVIDER NOTES
ED Provider Note    CHIEF COMPLAINT  Chief Complaint   Patient presents with   • Chest Pain       HPI  Jennifer Benton is a 51 y.o. 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  She says her assignments are variable and she feels emotionally depleted.  She tears up when giving history.  She has a family history of coronary artery disease that gives her significant concern and her blood pressure she states has been elevated in spite of increasing her hydrochlorothiazide this past week through her primary care doctor in addition to losartan for hypertension.  Denies any leg pain or swelling and denies fever, chills, sweats or cough and is fully Covid vaccinated    REVIEW OF SYSTEMS  See HPI for further details. All other systems are negative.     PAST MEDICAL HISTORY  Past Medical History:   Diagnosis Date   • Hyperlipidemia        FAMILY HISTORY  Family History   Problem Relation Age of Onset   • Heart Disease Father 48   • Diabetes Son         type 1       SOCIAL HISTORY   reports that she has never smoked. She has never used smokeless tobacco. She reports that she does not drink alcohol and does not use drugs.    SURGICAL HISTORY  No past surgical history on file.    CURRENT MEDICATIONS  Home Medications     Reviewed by Attila Herrera (Pharmacy Tech) on 10/13/21 at 1023  Med List Status: Complete   Medication Last Dose Status   albuterol 108 (90 Base) MCG/ACT Aero Soln inhalation aerosol > 1 month Active   cetirizine (ZYRTEC ALLERGY) 10 MG Tab 10/12/2021 Active   esomeprazole (NEXIUM) 20 MG capsule 10/12/2021 Active   hydroCHLOROthiazide (HYDRODIURIL) 25 MG Tab 10/13/2021 Active   ibuprofen (MOTRIN) 200 MG Tab > 3 weeks Active   losartan (COZAAR) 25 MG Tab 10/12/2021 Active   Mometasone Furo-Formoterol  "Fum (DULERA) 100-5 MCG/ACT Aerosol 10/13/2021 Active   montelukast (SINGULAIR) 10 MG Tab 10/12/2021 Active   sertraline (ZOLOFT) 50 MG Tab 10/12/2021 Active                ALLERGIES  Allergies   Allergen Reactions   • Other Food      Walnuts, pt reports that her tongue burns          PHYSICAL EXAM  VITAL SIGNS: BP (!) 164/106   Pulse 82   Temp 37.1 °C (98.7 °F) (Temporal)   Resp 16   Ht 1.575 m (5' 2\")   Wt 69.2 kg (152 lb 8.9 oz)   LMP  (Exact Date)   SpO2 95%   BMI 27.90 kg/m²    Constitutional: Well developed, Well nourished, No acute distress, Non-toxic appearance.   HENT: Normocephalic, Atraumatic, Bilateral external ears normal, Oropharynx is clear mucous membranes are moist. No oral exudates or nasal discharge.   Eyes: Pupils are equal round and reactive, EOMI, Conjunctiva normal, No discharge.   Neck: Normal range of motion, No tenderness, Supple, No stridor. No meningismus.  Lymphatic: No lymphadenopathy noted.   Cardiovascular: Regular rate and rhythm without murmur rub or gallop.  Thorax & Lungs: Clear breath sounds bilaterally without wheezes, rhonchi or rales. There is no chest wall tenderness.   Abdomen: Soft non-tender non-distended. There is no rebound or guarding. No organomegaly is appreciated. Bowel sounds are normal.  Skin: Normal without rash.   Back: No CVA or spinal tenderness.   Extremities: Intact distal pulses, No edema, No tenderness, No cyanosis, No clubbing. Capillary refill is less than 2 seconds.  Musculoskeletal: Good range of motion in all major joints. No tenderness to palpation or major deformities noted.   Neurologic: Alert & oriented x 3, Normal motor function, Normal sensory function, No focal deficits noted. Reflexes are normal.  Psychiatric: Affect normal, Judgment normal, Mood tearful. There is no suicidal ideation or patient reported hallucinations.     EKG  Results for orders placed or performed during the hospital encounter of 10/13/21   EKG   Result Value Ref " Range    Report       [facility] Emergency Dept.    Test Date:  2021-10-13  Pt Name:    LAUREANO ARROYO                Department: [facility]  MRN:        [id number]                      Room:  Gender:     Female                       Technician: AMADO  :        1970                   Requested By:ER TRIAGE PROTOCOL  Order #:    [id number]                    Reading MD: NICOLE BHAGAT MD    Measurements  Intervals                                Axis  Rate:       76                           P:          73  MS:         133                          QRS:        74  QRSD:       93                           T:          58  QT:         405  QTc:        456    Interpretive Statements  Sinus rhythm  No previous ECG available for comparison  Electronically Signed On 10- 10:32:30 PDT by NICOLE BHAGAT MD           RADIOLOGY/PROCEDURES  DX-CHEST-PORTABLE (1 VIEW)   Final Result      No evidence of acute cardiopulmonary process.            COURSE & MEDICAL DECISION MAKING  Pertinent Labs & Imaging studies reviewed. (See chart for details)  Patient presents with episodic chest discomfort that lasts sometimes a few minutes and then improves on its own and seems to be related to her feeling quite stressed out at work.  She also has suffered hypertensive episodes and her blood pressure change is not improving her diastolic is much as she thought it would    She is quite tearful given history and I am concerned that she is having some anxiety and potentially depression and I asked her about recent lab work and specifically thyroid studies and she has had none and therefore we did free T4, TSH and typical labs including troponin and I gave her initial aspirin.    EKG shows no evidence of acute dysrhythmia or ischemic changes.    Chest x-ray shows no evidence of airspace disease, cardiomegaly or mass    Laboratory evaluation reveals normal thyroid studies, normal troponin and unremarkable CBC and CHEM  panel    Patient feels a bit better.  Given her family history I think is reasonable for outpatient work-up.  Her heart score is 2.  I referred her to cardiology and who she will make an appointment for further cardiac evaluation which may include stress testing and have given her a work note for today in the next 2 days    FINAL IMPRESSION  1. Chest pain, unspecified type             Electronically signed by: Dash Bell M.D., 10/13/2021 10:30 AM   Repair Type: Complex Repair

## 2022-04-05 ENCOUNTER — TELEPHONE (OUTPATIENT)
Dept: MEDICAL GROUP | Facility: MEDICAL CENTER | Age: 52
End: 2022-04-05
Payer: COMMERCIAL

## 2022-04-05 DIAGNOSIS — I10 ESSENTIAL HYPERTENSION: ICD-10-CM

## 2022-04-06 RX ORDER — LOSARTAN POTASSIUM 25 MG/1
25 TABLET ORAL
Qty: 90 TABLET | Refills: 0 | Status: SHIPPED | OUTPATIENT
Start: 2022-04-06 | End: 2022-05-10 | Stop reason: SDUPTHER

## 2022-04-06 NOTE — TELEPHONE ENCOUNTER
VOICEMAIL  1. Caller Name: Jennifer                      Call Back Number:     2. Message: Pt states she has an upcoming new patient appointment with you but has run out of her Losartan and her BP is rising. Would like to know if you would be comfortable sending in a month to get her to her appointment with you or should she go to an .     3. Patient approves office to leave a detailed voicemail/MyChart message: N\A

## 2022-04-25 ENCOUNTER — APPOINTMENT (OUTPATIENT)
Dept: MEDICAL GROUP | Facility: MEDICAL CENTER | Age: 52
End: 2022-04-25
Payer: COMMERCIAL

## 2022-05-10 ENCOUNTER — OFFICE VISIT (OUTPATIENT)
Dept: MEDICAL GROUP | Facility: MEDICAL CENTER | Age: 52
End: 2022-05-10
Payer: COMMERCIAL

## 2022-05-10 VITALS
HEIGHT: 62 IN | WEIGHT: 152 LBS | HEART RATE: 72 BPM | TEMPERATURE: 97 F | SYSTOLIC BLOOD PRESSURE: 124 MMHG | BODY MASS INDEX: 27.97 KG/M2 | DIASTOLIC BLOOD PRESSURE: 92 MMHG | OXYGEN SATURATION: 94 %

## 2022-05-10 DIAGNOSIS — J45.40 MODERATE PERSISTENT ASTHMA WITHOUT COMPLICATION: ICD-10-CM

## 2022-05-10 DIAGNOSIS — J45.50 SEVERE PERSISTENT ASTHMA WITHOUT COMPLICATION: ICD-10-CM

## 2022-05-10 DIAGNOSIS — F41.9 ANXIETY AND DEPRESSION: ICD-10-CM

## 2022-05-10 DIAGNOSIS — Z00.00 HEALTHCARE MAINTENANCE: ICD-10-CM

## 2022-05-10 DIAGNOSIS — F32.A ANXIETY AND DEPRESSION: ICD-10-CM

## 2022-05-10 DIAGNOSIS — I10 ESSENTIAL HYPERTENSION: ICD-10-CM

## 2022-05-10 DIAGNOSIS — F41.9 ANXIETY: ICD-10-CM

## 2022-05-10 DIAGNOSIS — K21.9 GASTROESOPHAGEAL REFLUX DISEASE WITHOUT ESOPHAGITIS: ICD-10-CM

## 2022-05-10 DIAGNOSIS — Z12.4 SCREENING FOR CERVICAL CANCER: ICD-10-CM
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
## 2022-05-10 PROBLEM — S19.9XXA NECK INJURY: Status: RESOLVED | Noted: 2021-04-20 | Resolved: 2022-05-10

## 2022-05-10 PROCEDURE — 99214 OFFICE O/P EST MOD 30 MIN: CPT | Performed by: STUDENT IN AN ORGANIZED HEALTH CARE EDUCATION/TRAINING PROGRAM

## 2022-05-10 RX ORDER — LOSARTAN POTASSIUM 25 MG/1
25 TABLET ORAL
Qty: 90 TABLET | Refills: 3 | Status: SHIPPED | OUTPATIENT
Start: 2022-05-10 | End: 2023-04-07

## 2022-05-10 RX ORDER — MONTELUKAST SODIUM 10 MG/1
10 TABLET ORAL DAILY
Qty: 90 TABLET | Refills: 3 | Status: SHIPPED | OUTPATIENT
Start: 2022-05-10 | End: 2023-05-30

## 2022-05-10 ASSESSMENT — FIBROSIS 4 INDEX: FIB4 SCORE: 0.6

## 2022-05-10 ASSESSMENT — ENCOUNTER SYMPTOMS
SHORTNESS OF BREATH: 0
CHILLS: 0
FEVER: 0
HEARTBURN: 1

## 2022-05-10 NOTE — PROGRESS NOTES
"Subjective:     CC: Establishing with new provider    HPI:   Jennifer presents today for the following;    Problem   Anxiety and Depression    She is currently taking sertraline 50 mg daily     Essential Hypertension    She is currently taking hydrochlorothiazide and losartan 25 mg and controlled     Severe Persistent Asthma Without Complication    Patient is on Singulair, Dulera and albuterol                 Current Outpatient Medications Ordered in Epic   Medication Sig Dispense Refill   • sertraline (ZOLOFT) 50 MG Tab Take 1 Tablet by mouth every evening. 30 Tablet 5   • montelukast (SINGULAIR) 10 MG Tab Take 1 Tablet by mouth every day. 90 Tablet 3   • losartan (COZAAR) 25 MG Tab Take 1 Tablet by mouth every day. 90 Tablet 3   • Mometasone Furo-Formoterol Fum (DULERA) 100-5 MCG/ACT Aerosol Inhale 1 Puff 2 times a day. 39 g 3   • ibuprofen (MOTRIN) 200 MG Tab Take 800 mg by mouth every 6 hours as needed for Mild Pain.     • esomeprazole (NEXIUM) 20 MG capsule Take 20 mg by mouth every evening.     • albuterol 108 (90 Base) MCG/ACT Aero Soln inhalation aerosol Inhale 2 Puffs every four hours as needed for Shortness of Breath. 1 Each 5   • cetirizine (ZYRTEC ALLERGY) 10 MG Tab Take 10 mg by mouth every evening.       No current Epic-ordered facility-administered medications on file.           ROS:  Review of Systems   Constitutional: Negative for chills and fever.   Respiratory: Negative for shortness of breath.    Cardiovascular: Negative for chest pain.   Gastrointestinal: Positive for heartburn.       Objective:     Exam:  /92 (BP Location: Left arm, Patient Position: Sitting, BP Cuff Size: Adult)   Pulse 72   Temp 36.1 °C (97 °F) (Temporal)   Ht 1.575 m (5' 2\")   Wt 68.9 kg (152 lb)   SpO2 94%   BMI 27.80 kg/m²  Body mass index is 27.8 kg/m².    Physical Exam  Constitutional:       General: She is not in acute distress.     Appearance: She is not ill-appearing.   Pulmonary:      Effort: Pulmonary " effort is normal.   Neurological:      Mental Status: She is alert.   Psychiatric:         Mood and Affect: Mood normal.         Behavior: Behavior normal.         Thought Content: Thought content normal.         Judgment: Judgment normal.               Assessment & Plan:     Problem List Items Addressed This Visit     Anxiety and depression     Chronic  - Continue sertraline, she does want to taper off the medications however         Relevant Medications    sertraline (ZOLOFT) 50 MG Tab    Essential hypertension     Chronic-stable  - Continue losartan and stop hydrochlorothiazide for now          Relevant Medications    losartan (COZAAR) 25 MG Tab    Gastroesophageal reflux disease without esophagitis     Chronic  -refer to GI          Relevant Orders    Referral to Gastroenterology    Severe persistent asthma without complication     Chronic-stable           Relevant Medications    montelukast (SINGULAIR) 10 MG Tab    Mometasone Furo-Formoterol Fum (DULERA) 100-5 MCG/ACT Aerosol      Other Visit Diagnoses     Healthcare maintenance        Relevant Orders    HEMOGLOBIN A1C    CBC WITH DIFFERENTIAL    Comp Metabolic Panel    TSH WITH REFLEX TO FT4    Lipid Profile    Anxiety        Relevant Medications    sertraline (ZOLOFT) 50 MG Tab    Moderate persistent asthma without complication        Relevant Medications    montelukast (SINGULAIR) 10 MG Tab    Mometasone Furo-Formoterol Fum (DULERA) 100-5 MCG/ACT Aerosol    Screening for cervical cancer        Relevant Orders    Referral to Gynecology              Return in about 1 month (around 6/10/2022) for Annual.    Please note that this dictation was created using voice recognition software. I have made every reasonable attempt to correct obvious errors, but I expect that there are errors of grammar and possibly content that I did not discover before finalizing the note.

## 2022-06-13 ENCOUNTER — HOSPITAL ENCOUNTER (OUTPATIENT)
Dept: LAB | Facility: MEDICAL CENTER | Age: 52
End: 2022-06-13
Attending: STUDENT IN AN ORGANIZED HEALTH CARE EDUCATION/TRAINING PROGRAM
Payer: COMMERCIAL

## 2022-06-13 DIAGNOSIS — Z00.00 HEALTHCARE MAINTENANCE: ICD-10-CM

## 2022-06-13 LAB
ALBUMIN SERPL BCP-MCNC: 4.5 G/DL (ref 3.2–4.9)
ALBUMIN/GLOB SERPL: 1.5 G/DL
ALP SERPL-CCNC: 79 U/L (ref 30–99)
ALT SERPL-CCNC: 23 U/L (ref 2–50)
ANION GAP SERPL CALC-SCNC: 11 MMOL/L (ref 7–16)
AST SERPL-CCNC: 18 U/L (ref 12–45)
BASOPHILS # BLD AUTO: 1 % (ref 0–1.8)
BASOPHILS # BLD: 0.07 K/UL (ref 0–0.12)
BILIRUB SERPL-MCNC: 1 MG/DL (ref 0.1–1.5)
BUN SERPL-MCNC: 10 MG/DL (ref 8–22)
CALCIUM SERPL-MCNC: 9.5 MG/DL (ref 8.4–10.2)
CHLORIDE SERPL-SCNC: 102 MMOL/L (ref 96–112)
CHOLEST SERPL-MCNC: 244 MG/DL (ref 100–199)
CO2 SERPL-SCNC: 24 MMOL/L (ref 20–33)
CREAT SERPL-MCNC: 0.57 MG/DL (ref 0.5–1.4)
EOSINOPHIL # BLD AUTO: 0.41 K/UL (ref 0–0.51)
EOSINOPHIL NFR BLD: 5.8 % (ref 0–6.9)
ERYTHROCYTE [DISTWIDTH] IN BLOOD BY AUTOMATED COUNT: 43.7 FL (ref 35.9–50)
FASTING STATUS PATIENT QL REPORTED: NORMAL
GFR SERPLBLD CREATININE-BSD FMLA CKD-EPI: 111 ML/MIN/1.73 M 2
GLOBULIN SER CALC-MCNC: 3.1 G/DL (ref 1.9–3.5)
GLUCOSE SERPL-MCNC: 94 MG/DL (ref 65–99)
HCT VFR BLD AUTO: 44.8 % (ref 37–47)
HDLC SERPL-MCNC: 62 MG/DL
HGB BLD-MCNC: 15.3 G/DL (ref 12–16)
IMM GRANULOCYTES # BLD AUTO: 0.04 K/UL (ref 0–0.11)
IMM GRANULOCYTES NFR BLD AUTO: 0.6 % (ref 0–0.9)
LDLC SERPL CALC-MCNC: 164 MG/DL
LYMPHOCYTES # BLD AUTO: 2.59 K/UL (ref 1–4.8)
LYMPHOCYTES NFR BLD: 36.7 % (ref 22–41)
MCH RBC QN AUTO: 31.6 PG (ref 27–33)
MCHC RBC AUTO-ENTMCNC: 34.2 G/DL (ref 33.6–35)
MCV RBC AUTO: 92.6 FL (ref 81.4–97.8)
MONOCYTES # BLD AUTO: 0.61 K/UL (ref 0–0.85)
MONOCYTES NFR BLD AUTO: 8.7 % (ref 0–13.4)
NEUTROPHILS # BLD AUTO: 3.33 K/UL (ref 2–7.15)
NEUTROPHILS NFR BLD: 47.2 % (ref 44–72)
NRBC # BLD AUTO: 0 K/UL
NRBC BLD-RTO: 0 /100 WBC
PLATELET # BLD AUTO: 312 K/UL (ref 164–446)
PMV BLD AUTO: 7.9 FL (ref 9–12.9)
POTASSIUM SERPL-SCNC: 3.9 MMOL/L (ref 3.6–5.5)
PROT SERPL-MCNC: 7.6 G/DL (ref 6–8.2)
RBC # BLD AUTO: 4.84 M/UL (ref 4.2–5.4)
SODIUM SERPL-SCNC: 137 MMOL/L (ref 135–145)
TRIGL SERPL-MCNC: 92 MG/DL (ref 0–149)
TSH SERPL DL<=0.005 MIU/L-ACNC: 0.72 UIU/ML (ref 0.38–5.33)
WBC # BLD AUTO: 7.1 K/UL (ref 4.8–10.8)

## 2022-06-13 PROCEDURE — 80061 LIPID PANEL: CPT

## 2022-06-13 PROCEDURE — 80053 COMPREHEN METABOLIC PANEL: CPT

## 2022-06-13 PROCEDURE — 84443 ASSAY THYROID STIM HORMONE: CPT

## 2022-06-13 PROCEDURE — 85025 COMPLETE CBC W/AUTO DIFF WBC: CPT

## 2022-06-13 PROCEDURE — 36415 COLL VENOUS BLD VENIPUNCTURE: CPT

## 2022-06-13 PROCEDURE — 83036 HEMOGLOBIN GLYCOSYLATED A1C: CPT

## 2022-06-14 ENCOUNTER — OFFICE VISIT (OUTPATIENT)
Dept: MEDICAL GROUP | Facility: MEDICAL CENTER | Age: 52
End: 2022-06-14
Payer: COMMERCIAL

## 2022-06-14 VITALS
SYSTOLIC BLOOD PRESSURE: 126 MMHG | WEIGHT: 156 LBS | OXYGEN SATURATION: 95 % | HEIGHT: 62 IN | TEMPERATURE: 97 F | HEART RATE: 53 BPM | BODY MASS INDEX: 28.71 KG/M2 | DIASTOLIC BLOOD PRESSURE: 82 MMHG

## 2022-06-14 DIAGNOSIS — Z12.31 ENCOUNTER FOR SCREENING MAMMOGRAM FOR MALIGNANT NEOPLASM OF BREAST: ICD-10-CM

## 2022-06-14 DIAGNOSIS — E78.5 DYSLIPIDEMIA: ICD-10-CM

## 2022-06-14 DIAGNOSIS — Z12.11 COLON CANCER SCREENING: ICD-10-CM

## 2022-06-14 LAB
EST. AVERAGE GLUCOSE BLD GHB EST-MCNC: 94 MG/DL
HBA1C MFR BLD: 4.9 % (ref 4–5.6)

## 2022-06-14 PROCEDURE — 99396 PREV VISIT EST AGE 40-64: CPT | Performed by: STUDENT IN AN ORGANIZED HEALTH CARE EDUCATION/TRAINING PROGRAM

## 2022-06-14 ASSESSMENT — ENCOUNTER SYMPTOMS
CHILLS: 0
ABDOMINAL PAIN: 0
COUGH: 0
BLOOD IN STOOL: 0
PALPITATIONS: 0
SPUTUM PRODUCTION: 0
FEVER: 0
VOMITING: 0
HEARTBURN: 1

## 2022-06-14 ASSESSMENT — PATIENT HEALTH QUESTIONNAIRE - PHQ9: CLINICAL INTERPRETATION OF PHQ2 SCORE: 0

## 2022-06-14 ASSESSMENT — FIBROSIS 4 INDEX: FIB4 SCORE: 0.59

## 2022-06-14 NOTE — PROGRESS NOTES
Subjective:     CC:   Chief Complaint   Patient presents with   • Annual Exam       HPI:   Jennifer Benton is a 51 y.o. female who presents for annual exam    Patient has GYN provider: Yes  Last Pap Smear: 3-4 years ago  H/O Abnormal Pap: No  Last Mammogram: 2021  Last Bone Density Test: NA  Last Colorectal Cancer Screening: NA  Last Tdap: 2019  Received HPV series: No    Exercise: walks 3x/week for 1 hour each   Diet: above average      No LMP recorded. Patient is postmenopausal.  She has not utilized hormone replacement therapy.  Denies any menopausal symptoms.  No significant bloating/fluid retention, pelvic pain, or dyspareunia. No abnormal vaginal discharge.   No breast tenderness, mass, nipple discharge or changes in size or contour.    OB History   No obstetric history on file.      She  reports being sexually active.    She  has a past medical history of Hyperlipidemia.  She  has no past surgical history on file.    Family History   Problem Relation Age of Onset   • Heart Disease Father 48   • Diabetes Son         type 1     Social History     Tobacco Use   • Smoking status: Never Smoker   • Smokeless tobacco: Never Used   Vaping Use   • Vaping Use: Never used   Substance Use Topics   • Alcohol use: Never   • Drug use: Never       Patient Active Problem List    Diagnosis Date Noted   • Dyslipidemia 06/14/2022   • Family history of early CAD 11/17/2021   • Anxiety and depression 11/11/2021   • Chronic pansinusitis 10/11/2021   • Acquired deformity of nail 03/02/2021   • Essential hypertension 12/10/2020   • Gastroesophageal reflux disease without esophagitis 10/23/2019   • Severe persistent asthma without complication 10/23/2019     Current Outpatient Medications   Medication Sig Dispense Refill   • sertraline (ZOLOFT) 50 MG Tab Take 1 Tablet by mouth every evening. 30 Tablet 5   • montelukast (SINGULAIR) 10 MG Tab Take 1 Tablet by mouth every day. 90 Tablet 3   • losartan (COZAAR) 25 MG Tab Take 1 Tablet  "by mouth every day. 90 Tablet 3   • Mometasone Furo-Formoterol Fum (DULERA) 100-5 MCG/ACT Aerosol Inhale 1 Puff 2 times a day. 39 g 3   • ibuprofen (MOTRIN) 200 MG Tab Take 800 mg by mouth every 6 hours as needed for Mild Pain.     • esomeprazole (NEXIUM) 20 MG capsule Take 20 mg by mouth every evening.     • albuterol 108 (90 Base) MCG/ACT Aero Soln inhalation aerosol Inhale 2 Puffs every four hours as needed for Shortness of Breath. 1 Each 5   • cetirizine (ZYRTEC ALLERGY) 10 MG Tab Take 10 mg by mouth every evening.       No current facility-administered medications for this visit.     Allergies   Allergen Reactions   • Other Food      Walnuts, pt reports that her tongue burns          Review of Systems   Review of Systems   Constitutional: Negative for chills and fever.   Respiratory: Negative for cough and sputum production.    Cardiovascular: Negative for chest pain and palpitations.   Gastrointestinal: Positive for heartburn. Negative for abdominal pain, blood in stool and vomiting.         Objective:   /82 (BP Location: Left arm, Patient Position: Sitting, BP Cuff Size: Adult)   Pulse (!) 53   Temp 36.1 °C (97 °F) (Temporal)   Ht 1.575 m (5' 2\")   Wt 70.8 kg (156 lb)   SpO2 95%   BMI 28.53 kg/m²     Wt Readings from Last 4 Encounters:   06/14/22 70.8 kg (156 lb)   05/10/22 68.9 kg (152 lb)   11/17/21 68.5 kg (151 lb)   11/11/21 68.8 kg (151 lb 10.8 oz)       Physical Exam:  Physical Exam  Constitutional:       General: She is not in acute distress.     Appearance: She is not ill-appearing.   Pulmonary:      Effort: Pulmonary effort is normal.   Neurological:      Mental Status: She is alert.   Psychiatric:         Mood and Affect: Mood normal.         Behavior: Behavior normal.         Thought Content: Thought content normal.         Judgment: Judgment normal.           Assessment and Plan:     1. Dyslipidemia    2. Encounter for screening mammogram for malignant neoplasm of breast  - " [id number]; Future    3. Colon cancer screening  - Referral to GI for Colonoscopy      Health maintenance:  Uptodate, patient will be seeing gyn for pap  Labs per orders  Immunizations per orders  Patient counseled about skin care, diet, supplements, and exercise.  Discussed  diet and exercise     Follow-up: Return in about 1 year (around 6/14/2023) for Annual .

## 2022-09-07 ENCOUNTER — HOSPITAL ENCOUNTER (OUTPATIENT)
Dept: RADIOLOGY | Facility: MEDICAL CENTER | Age: 52
End: 2022-09-07
Attending: STUDENT IN AN ORGANIZED HEALTH CARE EDUCATION/TRAINING PROGRAM
Payer: COMMERCIAL

## 2022-09-07 ENCOUNTER — TELEMEDICINE (OUTPATIENT)
Dept: MEDICAL GROUP | Facility: MEDICAL CENTER | Age: 52
End: 2022-09-07
Payer: COMMERCIAL

## 2022-09-07 DIAGNOSIS — R05.9 COUGH: ICD-10-CM

## 2022-09-07 PROCEDURE — 71046 X-RAY EXAM CHEST 2 VIEWS: CPT

## 2022-09-07 PROCEDURE — 99213 OFFICE O/P EST LOW 20 MIN: CPT | Mod: 95 | Performed by: STUDENT IN AN ORGANIZED HEALTH CARE EDUCATION/TRAINING PROGRAM

## 2022-09-07 RX ORDER — AZITHROMYCIN 250 MG/1
TABLET, FILM COATED ORAL
Qty: 6 TABLET | Refills: 0 | Status: SHIPPED | OUTPATIENT
Start: 2022-09-07 | End: 2023-03-07

## 2022-09-07 ASSESSMENT — ENCOUNTER SYMPTOMS
SHORTNESS OF BREATH: 1
WHEEZING: 1
COUGH: 1
FEVER: 0
SPUTUM PRODUCTION: 0
CHILLS: 0

## 2022-09-07 NOTE — PROGRESS NOTES
Subjective:   This evaluation was conducted via Zoom using secure and encrypted videoconferencing technology. The patient was in their home in the [facility].    The patient's identity was confirmed and verbal consent was obtained for this virtual visit.    CC: Illness    HPI:   Jennifer presents today for the following;    Problem   Cough    Patient recently traveled to [place], her mom tested positive for COVID, she has not so far. She has been having a dry cough that started 8 days ago. She has constant yellow drainage. She has been feeling malaise. She has been using albuterol every 4 hours. She is taking mucous DM.         Current Outpatient Medications Ordered in Epic   Medication Sig Dispense Refill    azithromycin (ZITHROMAX) 250 MG Tab Please take 2 pills on the first day followed by 1 pill each day until prescription is finished 6 Tablet 0    sertraline (ZOLOFT) 50 MG Tab Take 1 Tablet by mouth every evening. 30 Tablet 5    montelukast (SINGULAIR) 10 MG Tab Take 1 Tablet by mouth every day. 90 Tablet 3    losartan (COZAAR) 25 MG Tab Take 1 Tablet by mouth every day. 90 Tablet 3    Mometasone Furo-Formoterol Fum (DULERA) 100-5 MCG/ACT Aerosol Inhale 1 Puff 2 times a day. 39 g 3    ibuprofen (MOTRIN) 200 MG Tab Take 800 mg by mouth every 6 hours as needed for Mild Pain.      esomeprazole (NEXIUM) 20 MG capsule Take 20 mg by mouth every evening.      albuterol 108 (90 Base) MCG/ACT Aero Soln inhalation aerosol Inhale 2 Puffs every four hours as needed for Shortness of Breath. 1 Each 5    cetirizine (ZYRTEC ALLERGY) 10 MG Tab Take 10 mg by mouth every evening.       No current Epic-ordered facility-administered medications on file.           ROS:  Review of Systems   Constitutional:  Positive for malaise/fatigue. Negative for chills and fever.   Respiratory:  Positive for cough, shortness of breath and wheezing. Negative for sputum production.    Cardiovascular:  Negative for chest pain.     Objective:      Exam:  There were no vitals taken for this visit. There is no height or weight on file to calculate BMI.    Physical Exam  Constitutional:       General: She is not in acute distress.     Appearance: She is not ill-appearing.   Pulmonary:      Effort: Pulmonary effort is normal.   Neurological:      Mental Status: She is alert.   Psychiatric:         Mood and Affect: Mood normal.         Behavior: Behavior normal.         Thought Content: Thought content normal.         Judgment: Judgment normal.             Assessment & Plan:     Problem List Items Addressed This Visit       Cough     Acute complicated by the fact that patient has asthma  - We will provide prescription for azithromycin  - Chest x-ray ordered         Relevant Medications    azithromycin (ZITHROMAX) 250 MG Tab    Other Relevant Orders    DX-CHEST-2 VIEWS         No follow-ups on file.    Please note that this dictation was created using voice recognition software. I have made every reasonable attempt to correct obvious errors, but I expect that there are errors of grammar and possibly content that I did not discover before finalizing the note.

## 2022-09-07 NOTE — ASSESSMENT & PLAN NOTE
Acute complicated by the fact that patient has asthma  - We will provide prescription for azithromycin  - Chest x-ray ordered

## 2022-09-08 DIAGNOSIS — R91.1 LUNG NODULE: ICD-10-CM

## 2022-09-27 DIAGNOSIS — J45.50 SEVERE PERSISTENT ASTHMA WITHOUT COMPLICATION: ICD-10-CM

## 2022-09-27 DIAGNOSIS — R91.1 LUNG NODULE: ICD-10-CM

## 2022-09-29 ENCOUNTER — HOSPITAL ENCOUNTER (OUTPATIENT)
Dept: RADIOLOGY | Facility: MEDICAL CENTER | Age: 52
End: 2022-09-29
Attending: STUDENT IN AN ORGANIZED HEALTH CARE EDUCATION/TRAINING PROGRAM
Payer: COMMERCIAL

## 2022-09-29 DIAGNOSIS — R91.1 LUNG NODULE: ICD-10-CM

## 2022-09-29 PROCEDURE — 71250 CT THORAX DX C-: CPT

## 2022-10-04 DIAGNOSIS — R91.1 LUNG NODULE: ICD-10-CM

## 2022-10-05 ENCOUNTER — OFFICE VISIT (OUTPATIENT)
Dept: SLEEP MEDICINE | Facility: MEDICAL CENTER | Age: 52
End: 2022-10-05
Payer: COMMERCIAL

## 2022-10-05 VITALS
BODY MASS INDEX: 29.08 KG/M2 | HEART RATE: 81 BPM | SYSTOLIC BLOOD PRESSURE: 104 MMHG | OXYGEN SATURATION: 96 % | DIASTOLIC BLOOD PRESSURE: 70 MMHG | HEIGHT: 62 IN | WEIGHT: 158 LBS

## 2022-10-05 DIAGNOSIS — J45.30 MILD PERSISTENT ASTHMA, UNSPECIFIED WHETHER COMPLICATED: ICD-10-CM

## 2022-10-05 DIAGNOSIS — R91.8 PULMONARY NODULES: ICD-10-CM

## 2022-10-05 DIAGNOSIS — J45.50 SEVERE PERSISTENT ASTHMA WITHOUT COMPLICATION: ICD-10-CM

## 2022-10-05 PROCEDURE — 99204 OFFICE O/P NEW MOD 45 MIN: CPT | Performed by: INTERNAL MEDICINE

## 2022-10-05 ASSESSMENT — ENCOUNTER SYMPTOMS
DIZZINESS: 0
SPUTUM PRODUCTION: 1
FEVER: 0
HEADACHES: 0
WHEEZING: 0
PALPITATIONS: 0
ABDOMINAL PAIN: 0
SINUS PAIN: 1
HEARTBURN: 1
SHORTNESS OF BREATH: 1
MYALGIAS: 0
COUGH: 1
WEIGHT LOSS: 0
CHILLS: 0

## 2022-10-05 ASSESSMENT — FIBROSIS 4 INDEX: FIB4 SCORE: 0.6

## 2022-10-05 NOTE — PROGRESS NOTES
"Pulmonary Clinic- Initial Consult    Date of Service: 10/5/22    Referring Physician: Herb Reyes D.O.    Reason for Consult: Asthma    Chief Complaint:   Chief Complaint   Patient presents with    Establish Care     Referred by Herb Reyes for asthma     Results     CT-Chest 09/30/22, DX-Chest 09/7/22       HPI:   Jennifer Benton is a 52 y.o. female who is followed by Dr. Reyes and is referred to the pulmonary clinic for Asthma. She has had asthma since childhood and has been managed on Dulera and Albuterol.  She has seasonal allergies for which she takes zyrtec and Singulair daily.  She has had COVID twice, most recently in August of this year.  She reports having shortness of breath which has improved but not resolved.  She has a cough, mostly to clear her throat and has intermittent hoarseness depending on the time of year.  She has a significant history of GERD and takes Prilosec.  She is scheduled to see GI for possible EGD and is aware of the small hiatal hernia on her CT chest.  CT was done for \"nodules\" seen on CXR and confirmed some GGO nodules which are consistent with a resolving viral infection.  She is a never smoker and states her parents smoked in the home when she was a child so she has significant second hand smoke exposure.  She is fully vaccinated against Covid.          Past Medical History:   Diagnosis Date    Cough     Hyperlipidemia     Shortness of breath     Wheezing        History reviewed. No pertinent surgical history.    Social History     Socioeconomic History    Marital status:      Spouse name: Not on file    Number of children: Not on file    Years of education: Not on file    Highest education level: Not on file   Occupational History    Not on file   Tobacco Use    Smoking status: Never    Smokeless tobacco: Never   Vaping Use    Vaping Use: Never used   Substance and Sexual Activity    Alcohol use: Yes     Comment: occ    Drug use: Never    Sexual activity: Yes "   Other Topics Concern    Not on file   Social History Narrative    Not on file     Social Determinants of Health     Financial Resource Strain: Not on file   Food Insecurity: Not on file   Transportation Needs: Not on file   Physical Activity: Not on file   Stress: Not on file   Social Connections: Not on file   Intimate Partner Violence: Not on file   Housing Stability: Not on file          Family History   Problem Relation Age of Onset    Heart Disease Father 48    Diabetes Son         type 1       Current Outpatient Medications on File Prior to Visit   Medication Sig Dispense Refill    azithromycin (ZITHROMAX) 250 MG Tab Please take 2 pills on the first day followed by 1 pill each day until prescription is finished 6 Tablet 0    sertraline (ZOLOFT) 50 MG Tab Take 1 Tablet by mouth every evening. 30 Tablet 5    montelukast (SINGULAIR) 10 MG Tab Take 1 Tablet by mouth every day. 90 Tablet 3    losartan (COZAAR) 25 MG Tab Take 1 Tablet by mouth every day. 90 Tablet 3    Mometasone Furo-Formoterol Fum (DULERA) 100-5 MCG/ACT Aerosol Inhale 1 Puff 2 times a day. 39 g 3    ibuprofen (MOTRIN) 200 MG Tab Take 800 mg by mouth every 6 hours as needed for Mild Pain.      esomeprazole (NEXIUM) 20 MG capsule Take 20 mg by mouth every evening.      albuterol 108 (90 Base) MCG/ACT Aero Soln inhalation aerosol Inhale 2 Puffs every four hours as needed for Shortness of Breath. 1 Each 5    cetirizine (ZYRTEC) 10 MG Tab Take 10 mg by mouth every evening.       No current facility-administered medications on file prior to visit.       Allergies: Other food      ROS:   Review of Systems   Constitutional:  Negative for chills, fever, malaise/fatigue and weight loss.   HENT:  Positive for congestion and sinus pain.         Hoarseness   Respiratory:  Positive for cough, sputum production and shortness of breath. Negative for wheezing.    Cardiovascular:  Negative for chest pain, palpitations and leg swelling.   Gastrointestinal:   "Positive for heartburn. Negative for abdominal pain.   Genitourinary:  Negative for dysuria.   Musculoskeletal:  Negative for myalgias.   Neurological:  Negative for dizziness and headaches.   Endo/Heme/Allergies:  Positive for environmental allergies.     Vitals:  /70 (BP Location: Left arm, Patient Position: Sitting, BP Cuff Size: Adult)   Pulse 81   Ht 1.575 m (5' 2\")   Wt 71.7 kg (158 lb)   SpO2 96%     Physical Exam:  Physical Exam  Constitutional:       Appearance: Normal appearance.   HENT:      Head: Atraumatic.      Mouth/Throat:      Mouth: Mucous membranes are dry.   Eyes:      Extraocular Movements: Extraocular movements intact.   Cardiovascular:      Rate and Rhythm: Normal rate and regular rhythm.   Pulmonary:      Effort: Pulmonary effort is normal.      Breath sounds: No wheezing.   Abdominal:      General: Abdomen is flat.      Palpations: Abdomen is soft.   Musculoskeletal:         General: No swelling.   Skin:     General: Skin is warm and dry.   Neurological:      General: No focal deficit present.      Mental Status: She is alert and oriented to person, place, and time.       Laboratory Data:      Pertinent Studies:    PFTs as reviewed by me personally show: none    Imaging as reviewed by me personally show:    CT Thorax 9/30/22:    IMPRESSION:     Multiple groundglass nodules in the left upper lobe, largest measuring up to 7 mm.     Fleischner Society pulmonary nodule recommendations:  CT at 3-6 months. Subsequent management based on the most suspicious nodule(s).    Echo: none      Assessment/Plan:    Problem List Items Addressed This Visit       Severe persistent asthma without complication     Still mildly exacerbated after Covid in late August  Plan:  - Increase therapy to Breo 200 for now  - PFTs  - Continue as needed albuterol  - Continue allergy treatment with Zyrtec  - Continue Singulair  - Follow up in February          Relevant Medications    Fluticasone Furoate-Vilanterol " (BREO ELLIPTA) 200-25 MCG/INH AEROSOL POWDER, BREATH ACTIVATED    Fluticasone Furoate-Vilanterol (BREO ELLIPTA) 200-25 MCG/INH AEROSOL POWDER, BREATH ACTIVATED    Pulmonary nodules     Likely GGO representing healing covid 19 infection  Plan:  - Follow up CT in 6 months            Other Visit Diagnoses       Mild persistent asthma, unspecified whether complicated        Relevant Medications    Fluticasone Furoate-Vilanterol (BREO ELLIPTA) 200-25 MCG/INH AEROSOL POWDER, BREATH ACTIVATED    Fluticasone Furoate-Vilanterol (BREO ELLIPTA) 200-25 MCG/INH AEROSOL POWDER, BREATH ACTIVATED    Other Relevant Orders    PULMONARY FUNCTION TESTS -Test requested: Complete Pulmonary Function Test             Return in about 4 months (around 2/5/2023).     This note was generated using voice recognition software which has a chance of producing errors of grammar and possibly content.  I have made every reasonable attempt to find and correct any obvious errors, but it should be expected that some may not be found prior to finalization of this note.    Time spent in record review prior to patient arrival, reviewing results, and in face-to-face encounter totaled 45 min, excluding any procedures if performed.      Sarita Lynch MD RD  Pulmonary and Critical Care Medicine  [facility]

## 2022-10-05 NOTE — ASSESSMENT & PLAN NOTE
Still mildly exacerbated after Covid in late August  Plan:  - Increase therapy to Breo 200 for now  - PFTs  - Continue as needed albuterol  - Continue allergy treatment with Zyrtec  - Continue Singulair  - Follow up in February

## 2023-02-24 ENCOUNTER — OFFICE VISIT (OUTPATIENT)
Dept: MEDICAL GROUP | Facility: MEDICAL CENTER | Age: 53
End: 2023-02-24
Payer: COMMERCIAL

## 2023-02-24 VITALS
SYSTOLIC BLOOD PRESSURE: 120 MMHG | DIASTOLIC BLOOD PRESSURE: 74 MMHG | TEMPERATURE: 97.2 F | HEART RATE: 95 BPM | OXYGEN SATURATION: 95 %

## 2023-02-24 DIAGNOSIS — J01.40 ACUTE NON-RECURRENT PANSINUSITIS: ICD-10-CM

## 2023-02-24 PROCEDURE — 99213 OFFICE O/P EST LOW 20 MIN: CPT | Performed by: STUDENT IN AN ORGANIZED HEALTH CARE EDUCATION/TRAINING PROGRAM

## 2023-02-24 RX ORDER — AMOXICILLIN AND CLAVULANATE POTASSIUM 875; 125 MG/1; MG/1
1 TABLET, FILM COATED ORAL 2 TIMES DAILY
Qty: 10 TABLET | Refills: 0 | Status: SHIPPED | OUTPATIENT
Start: 2023-02-24 | End: 2023-03-01

## 2023-02-24 ASSESSMENT — ENCOUNTER SYMPTOMS
CHILLS: 1
FEVER: 1
SINUS PAIN: 1
COUGH: 1
SHORTNESS OF BREATH: 0

## 2023-02-24 NOTE — LETTER
February 24, 2023    To whom this applies,    Jennifer Benton is feeling ill, please allow her to work from home today.                            Herb Reyes D.O.

## 2023-02-24 NOTE — LETTER
February 24, 2023      To whom this applies,    Jennifer Benton is feeling ill, please allow her to work from today.                        Herb Reyes D.O.

## 2023-02-24 NOTE — PROGRESS NOTES
Subjective:     CC: sinus pain    HPI:   Jennifer presents today for the following;    Problem   Acute Non-Recurrent Pansinusitis    Patient was have sinus pain 5 days ago and then initially improved but worsened a couple days ago. She is having yellow thick sinus drainage. She has had a fever and chills. She has a sinus infection once a year and follows with ENT. She has been having some dizziness as well         Current Outpatient Medications Ordered in Epic   Medication Sig Dispense Refill    amoxicillin-clavulanate (AUGMENTIN) 875-125 MG Tab Take 1 Tablet by mouth 2 times a day for 5 days. 10 Tablet 0    Fluticasone Furoate-Vilanterol (BREO ELLIPTA) 200-25 MCG/INH AEROSOL POWDER, BREATH ACTIVATED Inhale 1 Puff every day. Rinse mouth after use. 1 Each 3    Fluticasone Furoate-Vilanterol (BREO ELLIPTA) 200-25 MCG/INH AEROSOL POWDER, BREATH ACTIVATED Inhale 1 Puff every day. Rinse mouth after use. 2 Each 0    azithromycin (ZITHROMAX) 250 MG Tab Please take 2 pills on the first day followed by 1 pill each day until prescription is finished 6 Tablet 0    sertraline (ZOLOFT) 50 MG Tab Take 1 Tablet by mouth every evening. (Patient not taking: Reported on 2/24/2023) 30 Tablet 5    montelukast (SINGULAIR) 10 MG Tab Take 1 Tablet by mouth every day. 90 Tablet 3    losartan (COZAAR) 25 MG Tab Take 1 Tablet by mouth every day. (Patient not taking: Reported on 2/24/2023) 90 Tablet 3    Mometasone Furo-Formoterol Fum (DULERA) 100-5 MCG/ACT Aerosol Inhale 1 Puff 2 times a day. 39 g 3    ibuprofen (MOTRIN) 200 MG Tab Take 800 mg by mouth every 6 hours as needed for Mild Pain.      esomeprazole (NEXIUM) 20 MG capsule Take 20 mg by mouth every evening.      albuterol 108 (90 Base) MCG/ACT Aero Soln inhalation aerosol Inhale 2 Puffs every four hours as needed for Shortness of Breath. 1 Each 5    cetirizine (ZYRTEC) 10 MG Tab Take 10 mg by mouth every evening.       No current Epic-ordered facility-administered medications on file.            ROS:  Review of Systems   Constitutional:  Positive for chills, fever and malaise/fatigue.   HENT:  Positive for congestion and sinus pain.    Respiratory:  Positive for cough. Negative for shortness of breath.    Cardiovascular:  Negative for chest pain.     Objective:     Exam:  /74 (BP Location: Left arm, Patient Position: Sitting, BP Cuff Size: Adult)   Pulse 95   Temp 36.2 °C (97.2 °F) (Temporal)   SpO2 95%  There is no height or weight on file to calculate BMI.    Physical Exam  Constitutional:       General: She is not in acute distress.     Appearance: She is not ill-appearing.   Pulmonary:      Effort: Pulmonary effort is normal.   Neurological:      Mental Status: She is alert.   Psychiatric:         Mood and Affect: Mood normal.         Behavior: Behavior normal.         Thought Content: Thought content normal.         Judgment: Judgment normal.             Assessment & Plan:     Problem List Items Addressed This Visit       Acute non-recurrent pansinusitis     Acute  -likely bacterial  -she has tolerated Augmentin in the past, patient told to take medication with food   -patient will contact office if no improvement           Relevant Medications    amoxicillin-clavulanate (AUGMENTIN) 875-125 MG Tab               Please note that this dictation was created using voice recognition software. I have made every reasonable attempt to correct obvious errors, but I expect that there are errors of grammar and possibly content that I did not discover before finalizing the note.

## 2023-02-24 NOTE — ASSESSMENT & PLAN NOTE
Acute  -likely bacterial  -she has tolerated Augmentin in the past, patient told to take medication with food   -patient will contact office if no improvement

## 2023-03-01 ENCOUNTER — APPOINTMENT (OUTPATIENT)
Dept: PULMONOLOGY | Facility: MEDICAL CENTER | Age: 53
End: 2023-03-01
Attending: INTERNAL MEDICINE
Payer: COMMERCIAL

## 2023-03-07 ENCOUNTER — OFFICE VISIT (OUTPATIENT)
Dept: MEDICAL GROUP | Facility: LAB | Age: 53
End: 2023-03-07
Payer: COMMERCIAL

## 2023-03-07 VITALS
WEIGHT: 160.27 LBS | BODY MASS INDEX: 29.49 KG/M2 | HEART RATE: 74 BPM | SYSTOLIC BLOOD PRESSURE: 118 MMHG | OXYGEN SATURATION: 95 % | HEIGHT: 62 IN | TEMPERATURE: 97 F | DIASTOLIC BLOOD PRESSURE: 72 MMHG

## 2023-03-07 DIAGNOSIS — J45.50 SEVERE PERSISTENT ASTHMA WITHOUT COMPLICATION: ICD-10-CM

## 2023-03-07 PROBLEM — R05.1 ACUTE COUGH: Status: ACTIVE | Noted: 2022-09-07

## 2023-03-07 PROCEDURE — 99214 OFFICE O/P EST MOD 30 MIN: CPT | Performed by: STUDENT IN AN ORGANIZED HEALTH CARE EDUCATION/TRAINING PROGRAM

## 2023-03-07 RX ORDER — AZITHROMYCIN 250 MG/1
TABLET, FILM COATED ORAL
Qty: 6 TABLET | Refills: 0 | Status: SHIPPED | OUTPATIENT
Start: 2023-03-07 | End: 2023-04-07

## 2023-03-07 RX ORDER — PREDNISONE 10 MG/1
TABLET ORAL
Qty: 15 TABLET | Refills: 0 | Status: SHIPPED | OUTPATIENT
Start: 2023-03-07 | End: 2023-03-13

## 2023-03-07 ASSESSMENT — ENCOUNTER SYMPTOMS
COUGH: 0
BLOOD IN STOOL: 0
ABDOMINAL PAIN: 0
SPUTUM PRODUCTION: 0
VOMITING: 0
PALPITATIONS: 0
FEVER: 0
CHILLS: 0

## 2023-03-07 ASSESSMENT — FIBROSIS 4 INDEX: FIB4 SCORE: 0.6

## 2023-03-08 NOTE — PROGRESS NOTES
Subjective:     CC: cough    HPI:   Jennifer presents today for the following;    Problem   Acute Cough    Patient recently traveled to [place], her mom tested positive for COVID, she has not so far. She has been having a dry cough that started 8 days ago. She has constant yellow drainage. She has been feeling malaise. She has been using albuterol every 4 hours. She is taking mucous DM.     Severe Persistent Asthma Without Complication    Patient is on Singulair, Dulera and albuterol. She normally does not require albuterol. She denies any night time awakenings    3/7/23  -cough is worsening, she is using her albuterol inhaler more frequently. No reported measured fevers             Current Outpatient Medications Ordered in Epic   Medication Sig Dispense Refill    predniSONE (DELTASONE) 10 MG Tab Take 4 Tablets by mouth every day for 1 day, THEN 3 Tablets every day for 2 days, THEN 2 Tablets every day for 2 days, THEN 1 Tablet every day for 1 day. 15 Tablet 0    azithromycin (ZITHROMAX) 250 MG Tab Please take 2 pills on day 1, followed by 1 pill each day until prescription completed. 6 Tablet 0    Fluticasone Furoate-Vilanterol (BREO ELLIPTA) 200-25 MCG/INH AEROSOL POWDER, BREATH ACTIVATED Inhale 1 Puff every day. Rinse mouth after use. 1 Each 3    Fluticasone Furoate-Vilanterol (BREO ELLIPTA) 200-25 MCG/INH AEROSOL POWDER, BREATH ACTIVATED Inhale 1 Puff every day. Rinse mouth after use. 2 Each 0    sertraline (ZOLOFT) 50 MG Tab Take 1 Tablet by mouth every evening. (Patient not taking: Reported on 2/24/2023) 30 Tablet 5    montelukast (SINGULAIR) 10 MG Tab Take 1 Tablet by mouth every day. 90 Tablet 3    losartan (COZAAR) 25 MG Tab Take 1 Tablet by mouth every day. (Patient not taking: Reported on 2/24/2023) 90 Tablet 3    Mometasone Furo-Formoterol Fum (DULERA) 100-5 MCG/ACT Aerosol Inhale 1 Puff 2 times a day. 39 g 3    ibuprofen (MOTRIN) 200 MG Tab Take 800 mg by mouth every 6 hours as needed for Mild Pain.       "esomeprazole (NEXIUM) 20 MG capsule Take 20 mg by mouth every evening.      albuterol 108 (90 Base) MCG/ACT Aero Soln inhalation aerosol Inhale 2 Puffs every four hours as needed for Shortness of Breath. 1 Each 5    cetirizine (ZYRTEC) 10 MG Tab Take 10 mg by mouth every evening.       No current Epic-ordered facility-administered medications on file.           ROS:  Review of Systems   Constitutional:  Negative for chills and fever.   HENT:  Positive for congestion.    Respiratory:  Negative for cough and sputum production.    Cardiovascular:  Negative for chest pain and palpitations.   Gastrointestinal:  Negative for abdominal pain, blood in stool and vomiting.     Objective:     Exam:  /72 (BP Location: Left arm, Patient Position: Sitting, BP Cuff Size: Adult)   Pulse 74   Temp 36.1 °C (97 °F) (Temporal)   Ht 1.575 m (5' 2\")   Wt 72.7 kg (160 lb 4.4 oz)   SpO2 95%   BMI 29.31 kg/m²  Body mass index is 29.31 kg/m².    Physical Exam  Constitutional:       General: She is not in acute distress.     Appearance: She is not ill-appearing.   Pulmonary:      Effort: Pulmonary effort is normal. No respiratory distress.      Breath sounds: No stridor. Rhonchi present. No wheezing or rales.   Neurological:      Mental Status: She is alert.   Psychiatric:         Mood and Affect: Mood normal.         Behavior: Behavior normal.         Thought Content: Thought content normal.         Judgment: Judgment normal.             Assessment & Plan:     Problem List Items Addressed This Visit       Severe persistent asthma without complication     Acute on chronic  -on PE she had rhonchi on the right side   -will provide azithromycin and prednisone  -ER precautions  given         Relevant Medications    predniSONE (DELTASONE) 10 MG Tab    azithromycin (ZITHROMAX) 250 MG Tab             Please note that this dictation was created using voice recognition software. I have made every reasonable attempt to correct obvious " errors, but I expect that there are errors of grammar and possibly content that I did not discover before finalizing the note.

## 2023-03-08 NOTE — ASSESSMENT & PLAN NOTE
Acute on chronic  -on PE she had rhonchi on the right side   -will provide azithromycin and prednisone  -ER precautions  given

## 2023-04-06 ENCOUNTER — HOSPITAL ENCOUNTER (OUTPATIENT)
Dept: RADIOLOGY | Facility: MEDICAL CENTER | Age: 53
End: 2023-04-06
Attending: STUDENT IN AN ORGANIZED HEALTH CARE EDUCATION/TRAINING PROGRAM
Payer: COMMERCIAL

## 2023-04-06 DIAGNOSIS — R91.1 LUNG NODULE: ICD-10-CM

## 2023-04-06 PROCEDURE — 71250 CT THORAX DX C-: CPT

## 2023-04-07 ENCOUNTER — OFFICE VISIT (OUTPATIENT)
Dept: SLEEP MEDICINE | Facility: MEDICAL CENTER | Age: 53
End: 2023-04-07
Attending: INTERNAL MEDICINE
Payer: COMMERCIAL

## 2023-04-07 VITALS
WEIGHT: 158 LBS | DIASTOLIC BLOOD PRESSURE: 82 MMHG | BODY MASS INDEX: 29.08 KG/M2 | HEART RATE: 80 BPM | OXYGEN SATURATION: 94 % | HEIGHT: 62 IN | SYSTOLIC BLOOD PRESSURE: 126 MMHG

## 2023-04-07 DIAGNOSIS — R91.8 PULMONARY NODULES: ICD-10-CM

## 2023-04-07 DIAGNOSIS — J45.50 SEVERE PERSISTENT ASTHMA WITHOUT COMPLICATION: ICD-10-CM

## 2023-04-07 DIAGNOSIS — J45.51 SEVERE PERSISTENT ASTHMA WITH ACUTE EXACERBATION: ICD-10-CM

## 2023-04-07 PROCEDURE — 99212 OFFICE O/P EST SF 10 MIN: CPT | Performed by: INTERNAL MEDICINE

## 2023-04-07 PROCEDURE — 99214 OFFICE O/P EST MOD 30 MIN: CPT | Performed by: INTERNAL MEDICINE

## 2023-04-07 RX ORDER — FLUTICASONE FUROATE AND VILANTEROL 200; 25 UG/1; UG/1
1 POWDER RESPIRATORY (INHALATION) DAILY
Qty: 1 EACH | Refills: 12 | Status: SHIPPED | OUTPATIENT
Start: 2023-04-07

## 2023-04-07 ASSESSMENT — ENCOUNTER SYMPTOMS
HEADACHES: 0
WEIGHT LOSS: 0
HEARTBURN: 1
WHEEZING: 0
SINUS PAIN: 1
ABDOMINAL PAIN: 0
SHORTNESS OF BREATH: 1
MYALGIAS: 0
DIZZINESS: 0
SPUTUM PRODUCTION: 1
PALPITATIONS: 0
COUGH: 1
FEVER: 0
CHILLS: 0

## 2023-04-07 ASSESSMENT — PATIENT HEALTH QUESTIONNAIRE - PHQ9: CLINICAL INTERPRETATION OF PHQ2 SCORE: 0

## 2023-04-07 ASSESSMENT — FIBROSIS 4 INDEX: FIB4 SCORE: 0.6

## 2023-04-07 NOTE — PROGRESS NOTES
"Pulmonary Clinic- Follow up    Date of Service: 4/7/23    Referring Physician: Herb Reyes D.O.    Reason for Consult: Asthma    Chief Complaint:   Chief Complaint   Patient presents with    Asthma     Last seen 10/05/22    Results     CT-Chest 04/06/23       HPI:   Jennifer Benton is a 52 y.o. female who is followed in the pulmonary clinic for Asthma, last seen 10/5/22. She has had asthma since childhood and has been managed on Dulera and Albuterol.  She has seasonal allergies for which she takes zyrtec and Singulair daily.  She has had COVID twice, most recently in August of this year.  She reports having shortness of breath which has improved but not resolved.  She has a cough, mostly to clear her throat and has intermittent hoarseness depending on the time of year.  She has a significant history of GERD and takes Prilosec.  She is scheduled to see GI for possible EGD and is aware of the small hiatal hernia on her CT chest.  CT was done for \"nodules\" seen on CXR and confirmed some GGO nodules which are consistent with a resolving viral infection.  She is a never smoker and states her parents smoked in the home when she was a child so she has significant second hand smoke exposure.  She is fully vaccinated against Covid.        4/7/23:  Patient reports 2 exacerbations already this year.  Just starting to feel better.  Allergies are getting worse due to spring time.  She was prescribed Amoxicillin for one exacerbation and azithromycin and prednisone for the other.  She is still on Breo and is compliant.  Still using albuterol 1-2 times daily on a bad week and 3x a week on a good week.  Symptoms are not controlled.  She is taking a lot of allergy medication without complete control including zyrtec, claritin and singulair.     Past Medical History:   Diagnosis Date    Cough     Hyperlipidemia     Shortness of breath     Wheezing        No past surgical history on file.    Social History     Socioeconomic " History    Marital status:      Spouse name: Not on file    Number of children: Not on file    Years of education: Not on file    Highest education level: Not on file   Occupational History    Not on file   Tobacco Use    Smoking status: Never    Smokeless tobacco: Never   Vaping Use    Vaping Use: Never used   Substance and Sexual Activity    Alcohol use: Yes     Comment: occ    Drug use: Never    Sexual activity: Yes   Other Topics Concern    Not on file   Social History Narrative    Not on file     Social Determinants of Health     Financial Resource Strain: Not on file   Food Insecurity: Not on file   Transportation Needs: Not on file   Physical Activity: Not on file   Stress: Not on file   Social Connections: Not on file   Intimate Partner Violence: Not on file   Housing Stability: Not on file          Family History   Problem Relation Age of Onset    Heart Disease Father 48    Diabetes Son         type 1       Current Outpatient Medications on File Prior to Visit   Medication Sig Dispense Refill    sertraline (ZOLOFT) 50 MG Tab Take 1 Tablet by mouth every evening. 30 Tablet 5    montelukast (SINGULAIR) 10 MG Tab Take 1 Tablet by mouth every day. 90 Tablet 3    ibuprofen (MOTRIN) 200 MG Tab Take 800 mg by mouth every 6 hours as needed for Mild Pain.      esomeprazole (NEXIUM) 20 MG capsule Take 20 mg by mouth every evening.      albuterol 108 (90 Base) MCG/ACT Aero Soln inhalation aerosol Inhale 2 Puffs every four hours as needed for Shortness of Breath. 1 Each 5    cetirizine (ZYRTEC) 10 MG Tab Take 10 mg by mouth every evening.      azithromycin (ZITHROMAX) 250 MG Tab Please take 2 pills on day 1, followed by 1 pill each day until prescription completed. (Patient not taking: Reported on 4/7/2023) 6 Tablet 0    losartan (COZAAR) 25 MG Tab Take 1 Tablet by mouth every day. (Patient not taking: Reported on 2/24/2023) 90 Tablet 3     No current facility-administered medications on file prior to visit.  "      Allergies: Other food      ROS:   Review of Systems   Constitutional:  Negative for chills, fever, malaise/fatigue and weight loss.   HENT:  Positive for congestion and sinus pain.         Hoarseness   Respiratory:  Positive for cough, sputum production and shortness of breath. Negative for wheezing.    Cardiovascular:  Negative for chest pain, palpitations and leg swelling.   Gastrointestinal:  Positive for heartburn. Negative for abdominal pain.   Genitourinary:  Negative for dysuria.   Musculoskeletal:  Negative for myalgias.   Neurological:  Negative for dizziness and headaches.   Endo/Heme/Allergies:  Positive for environmental allergies.     Vitals:  /82 (BP Location: Left arm, Patient Position: Sitting, BP Cuff Size: Adult)   Pulse 80   Ht 1.575 m (5' 2\")   Wt 71.7 kg (158 lb)   SpO2 94%     Physical Exam:  Physical Exam  Constitutional:       Appearance: Normal appearance.   HENT:      Head: Atraumatic.      Mouth/Throat:      Mouth: Mucous membranes are dry.   Eyes:      Extraocular Movements: Extraocular movements intact.   Cardiovascular:      Rate and Rhythm: Normal rate and regular rhythm.   Pulmonary:      Effort: Pulmonary effort is normal.      Breath sounds: No wheezing.   Abdominal:      General: Abdomen is flat.      Palpations: Abdomen is soft.   Musculoskeletal:         General: No swelling.   Skin:     General: Skin is warm and dry.   Neurological:      General: No focal deficit present.      Mental Status: She is alert and oriented to person, place, and time.       Laboratory Data:      Pertinent Studies:    PFTs as reviewed by me personally show: none    Imaging as reviewed by me personally show:    4/7/23:  IMPRESSION:     1.  Interval resolution of previously described groundglass nodules in the LEFT upper lobe.  2.  New ill-defined parenchymal opacity in the RIGHT upper lobe, and groundglass nodules in the RIGHT lower lobe.  Appearance favors inflammatory process.    CT " Thorax 9/30/22:    IMPRESSION:     Multiple groundglass nodules in the left upper lobe, largest measuring up to 7 mm.     Fleischner Society pulmonary nodule recommendations:  CT at 3-6 months. Subsequent management based on the most suspicious nodule(s).    Echo: none      Assessment/Plan:    Problem List Items Addressed This Visit       Severe persistent asthma without complication     Patient with daily symptoms requiring albuterol.  S/p two exacerbations already this year, one resolved with abx and the other with abx and prednisone.  She is compliant with her Breo 200 which helps.  She is using zyrtec in pm and claritin in am with significant allergy symptoms.  She also uses singulair.  She has seen allergists in the past and had allergy shots without improvement.    Plan:  - Perhaps a candidate for a biologic  - Continue Breo 200  - Continue albuterol as needed  - Continue zyrtec and claritin for allergies and singulair at night  - IgE and CBC for possible biologic  - PFTs  - Follow up in May with Dr. Lynch         Relevant Medications    fluticasone furoate-vilanterol (BREO ELLIPTA) 200-25 MCG/ACT AEROSOL POWDER, BREATH ACTIVATED    Pulmonary nodules     GGO nodules have all resolved and reappeared in different areas.  Likely inflammatory due to asthma.  Patient is low risk.  No need to continue to follow.           Other Visit Diagnoses       Severe persistent asthma with acute exacerbation        Relevant Medications    fluticasone furoate-vilanterol (BREO ELLIPTA) 200-25 MCG/ACT AEROSOL POWDER, BREATH ACTIVATED    Other Relevant Orders    IGE TOTAL [facility] IMMUNOCAP    CBC WITH DIFFERENTIAL             Return in about 4 weeks (around 5/5/2023) for Dr. Lynch.     This note was generated using voice recognition software which has a chance of producing errors of grammar and possibly content.  I have made every reasonable attempt to find and correct any obvious errors, but it should be expected that some may not  be found prior to finalization of this note.    Time spent in record review prior to patient arrival, reviewing results, and in face-to-face encounter totaled 30 min, excluding any procedures if performed.      Sarita Lynch MD RD  Pulmonary and Critical Care Medicine  [facility]

## 2023-04-07 NOTE — ASSESSMENT & PLAN NOTE
GGO nodules have all resolved and reappeared in different areas.  Likely inflammatory due to asthma.  Patient is low risk.  No need to continue to follow.

## 2023-04-07 NOTE — ASSESSMENT & PLAN NOTE
Patient with daily symptoms requiring albuterol.  S/p two exacerbations already this year, one resolved with abx and the other with abx and prednisone.  She is compliant with her Breo 200 which helps.  She is using zyrtec in pm and claritin in am with significant allergy symptoms.  She also uses singulair.  She has seen allergists in the past and had allergy shots without improvement.    Plan:  - Perhaps a candidate for a biologic  - Continue Breo 200  - Continue albuterol as needed  - Continue zyrtec and claritin for allergies and singulair at night  - IgE and CBC for possible biologic  - PFTs  - Follow up in May with Dr. Lynch

## 2023-04-24 ENCOUNTER — NON-PROVIDER VISIT (OUTPATIENT)
Dept: SLEEP MEDICINE | Facility: MEDICAL CENTER | Age: 53
End: 2023-04-24
Attending: INTERNAL MEDICINE
Payer: COMMERCIAL

## 2023-04-24 VITALS — WEIGHT: 159.8 LBS | HEIGHT: 63 IN | BODY MASS INDEX: 28.31 KG/M2

## 2023-04-24 DIAGNOSIS — J45.30 MILD PERSISTENT ASTHMA, UNSPECIFIED WHETHER COMPLICATED: ICD-10-CM

## 2023-04-24 PROCEDURE — 94729 DIFFUSING CAPACITY: CPT | Mod: 26 | Performed by: INTERNAL MEDICINE

## 2023-04-24 PROCEDURE — 94726 PLETHYSMOGRAPHY LUNG VOLUMES: CPT | Mod: 26 | Performed by: INTERNAL MEDICINE

## 2023-04-24 PROCEDURE — 94060 EVALUATION OF WHEEZING: CPT | Mod: 26 | Performed by: INTERNAL MEDICINE

## 2023-04-24 ASSESSMENT — PULMONARY FUNCTION TESTS
FEV1/FVC_PERCENT_PREDICTED: 68
FEV1/FVC_PERCENT_PREDICTED: 70
FEV1_PERCENT_CHANGE: 16
FVC_PERCENT_PREDICTED: 98
FEV1/FVC: 56.35
FEV1/FVC_PERCENT_CHANGE: 1
FEV1: 1.82
FVC: 3.23
FEV1/FVC: 56
FEV1: 1.56
FEV1_PREDICTED: 2.63
FVC: 2.81
FEV1/FVC_PERCENT_PREDICTED: 69
FEV1/FVC_PERCENT_CHANGE: 114
FEV1/FVC_PERCENT_LLN: 67
FEV1_PERCENT_PREDICTED: 59
FEV1_LLN: 2.20
FEV1/FVC_PERCENT_PREDICTED: 80
FVC_PREDICTED: 3.27
FVC_PERCENT_PREDICTED: 85
FVC_LLN: 2.73
FEV1/FVC: 56
FEV1_PERCENT_PREDICTED: 69
FEV1/FVC_PREDICTED: 81
FEV1/FVC_PERCENT_PREDICTED: 69
FEV1/FVC: 56
FEV1_PERCENT_CHANGE: 14

## 2023-04-24 ASSESSMENT — FIBROSIS 4 INDEX: FIB4 SCORE: 0.6

## 2023-04-24 NOTE — PROCEDURES
Tech: Beronica Estrada, RT  Good patient effort & cooperation.  Test was performed on the [employer] Body Plethysmograph- Elite DX system.  The predicted sets used for Spirometry are GLI-2012, for Lung Volumes are ITS, and for DLCO is GLI 2017.  The results of this test meet the ATS standards for acceptability and repeatability.  The DLCO was uncorrected for Hb.  A bronchodilator of Ventolin HFA 2 puffs via spacer was administered.  DLCO was performed during dilation period.    Interpretation:   Baseline spirometry shows airflow obstruction with an FEV1/FVC ratio 56 and an FEV1 of 1.56 L or 59% predicted.  There is significant bronchodilator spots with improvement in FEV1 to 1.82 L or 69% predicted.  Total lung capacity is elevated at 6.09 L or 126% predicted.  There is evidence for air trapping with residual volume of 173% predicted.  Diffusion capacity is elevated at 129% predicted.  Pulmonary function testing shows airflow obstruction with mild hyperinflation, air trapping and elevated DLCO with bronchodilator responsiveness all consistent with stated diagnosis of asthma or reactive airways disease.

## 2023-05-09 ENCOUNTER — HOSPITAL ENCOUNTER (OUTPATIENT)
Dept: LAB | Facility: MEDICAL CENTER | Age: 53
End: 2023-05-09
Attending: INTERNAL MEDICINE
Payer: COMMERCIAL

## 2023-05-09 DIAGNOSIS — J45.51 SEVERE PERSISTENT ASTHMA WITH ACUTE EXACERBATION: ICD-10-CM

## 2023-05-09 LAB
BASOPHILS # BLD AUTO: 1.2 % (ref 0–1.8)
BASOPHILS # BLD: 0.08 K/UL (ref 0–0.12)
EOSINOPHIL # BLD AUTO: 0.39 K/UL (ref 0–0.51)
EOSINOPHIL NFR BLD: 5.9 % (ref 0–6.9)
ERYTHROCYTE [DISTWIDTH] IN BLOOD BY AUTOMATED COUNT: 43.1 FL (ref 35.9–50)
HCT VFR BLD AUTO: 44.3 % (ref 37–47)
HGB BLD-MCNC: 15.4 G/DL (ref 12–16)
IMM GRANULOCYTES # BLD AUTO: 0.03 K/UL (ref 0–0.11)
IMM GRANULOCYTES NFR BLD AUTO: 0.5 % (ref 0–0.9)
LYMPHOCYTES # BLD AUTO: 2.28 K/UL (ref 1–4.8)
LYMPHOCYTES NFR BLD: 34.3 % (ref 22–41)
MCH RBC QN AUTO: 31.6 PG (ref 27–33)
MCHC RBC AUTO-ENTMCNC: 34.8 G/DL (ref 33.6–35)
MCV RBC AUTO: 90.8 FL (ref 81.4–97.8)
MONOCYTES # BLD AUTO: 0.59 K/UL (ref 0–0.85)
MONOCYTES NFR BLD AUTO: 8.9 % (ref 0–13.4)
NEUTROPHILS # BLD AUTO: 3.28 K/UL (ref 2–7.15)
NEUTROPHILS NFR BLD: 49.2 % (ref 44–72)
NRBC # BLD AUTO: 0 K/UL
NRBC BLD-RTO: 0 /100 WBC
PLATELET # BLD AUTO: 301 K/UL (ref 164–446)
PMV BLD AUTO: 8.1 FL (ref 9–12.9)
RBC # BLD AUTO: 4.88 M/UL (ref 4.2–5.4)
WBC # BLD AUTO: 6.7 K/UL (ref 4.8–10.8)

## 2023-05-09 PROCEDURE — 85025 COMPLETE CBC W/AUTO DIFF WBC: CPT

## 2023-05-09 PROCEDURE — 82785 ASSAY OF IGE: CPT

## 2023-05-09 PROCEDURE — 36415 COLL VENOUS BLD VENIPUNCTURE: CPT

## 2023-05-11 LAB — IGE SERPL-ACNC: 1466 KU/L

## 2023-05-14 ASSESSMENT — ENCOUNTER SYMPTOMS
WEIGHT LOSS: 0
FEVER: 0
CHILLS: 0
PALPITATIONS: 0
SINUS PAIN: 1
SPUTUM PRODUCTION: 1
WHEEZING: 0
HEADACHES: 0
COUGH: 1
DIZZINESS: 0
HEARTBURN: 1
SHORTNESS OF BREATH: 1
MYALGIAS: 0
ABDOMINAL PAIN: 0

## 2023-05-14 NOTE — PROGRESS NOTES
"Pulmonary Clinic- Follow up    Date of Service: 4/7/23    Referring Physician: Herb Reyes D.O.    Reason for Consult: Asthma    Chief Complaint:   No chief complaint on file.      HPI:   Jennifer Benton is a 52 y.o. female who is followed in the pulmonary clinic for Asthma, last seen 10/5/22. She has had asthma since childhood and has been managed on Dulera and Albuterol.  She has seasonal allergies for which she takes zyrtec and Singulair daily.  She has had COVID twice, most recently in August of this year.  She reports having shortness of breath which has improved but not resolved.  She has a cough, mostly to clear her throat and has intermittent hoarseness depending on the time of year.  She has a significant history of GERD and takes Prilosec.  She is scheduled to see GI for possible EGD and is aware of the small hiatal hernia on her CT chest.  CT was done for \"nodules\" seen on CXR and confirmed some GGO nodules which are consistent with a resolving viral infection.  She is a never smoker and states her parents smoked in the home when she was a child so she has significant second hand smoke exposure.  She is fully vaccinated against Covid.        4/7/23:  Patient reports 2 exacerbations already this year.  Just starting to feel better.  Allergies are getting worse due to spring time.  She was prescribed Amoxicillin for one exacerbation and azithromycin and prednisone for the other.  She is still on Breo and is compliant.  Still using albuterol 1-2 times daily on a bad week and 3x a week on a good week.  Symptoms are not controlled.  She is taking a lot of allergy medication without complete control including zyrtec, claritin and singulair.     Severe persistent asthma without complication        Patient with daily symptoms requiring albuterol.  S/p two exacerbations already this year, one resolved with abx and the other with abx and prednisone.  She is compliant with her Breo 200 which helps.  She is " using zyrtec in pm and claritin in am with significant allergy symptoms.  She also uses singulair.  She has seen allergists in the past and had allergy shots without improvement.    Plan:  - Perhaps a candidate for a biologic  - Continue Breo 200  - Continue albuterol as needed  - Continue zyrtec and claritin for allergies and singulair at night  - IgE and CBC for possible biologic  - PFTs  - Follow up in May with Dr. Lynch        Eos: 390  IgE 1466    Past Medical History:   Diagnosis Date    Cough     Hyperlipidemia     Shortness of breath     Wheezing        No past surgical history on file.    Social History     Socioeconomic History    Marital status:      Spouse name: Not on file    Number of children: Not on file    Years of education: Not on file    Highest education level: Not on file   Occupational History    Not on file   Tobacco Use    Smoking status: Never    Smokeless tobacco: Never   Vaping Use    Vaping Use: Never used   Substance and Sexual Activity    Alcohol use: Yes     Comment: occ    Drug use: Never    Sexual activity: Yes   Other Topics Concern    Not on file   Social History Narrative    Not on file     Social Determinants of Health     Financial Resource Strain: Not on file   Food Insecurity: Not on file   Transportation Needs: Not on file   Physical Activity: Not on file   Stress: Not on file   Social Connections: Not on file   Intimate Partner Violence: Not on file   Housing Stability: Not on file          Family History   Problem Relation Age of Onset    Heart Disease Father 48    Diabetes Son         type 1       Current Outpatient Medications on File Prior to Visit   Medication Sig Dispense Refill    fluticasone furoate-vilanterol (BREO ELLIPTA) 200-25 MCG/ACT AEROSOL POWDER, BREATH ACTIVATED Inhale 1 Puff every day. Rinse mouth after use. 1 Each 12    sertraline (ZOLOFT) 50 MG Tab Take 1 Tablet by mouth every evening. 30 Tablet 5    montelukast (SINGULAIR) 10 MG Tab Take 1  Tablet by mouth every day. 90 Tablet 3    ibuprofen (MOTRIN) 200 MG Tab Take 800 mg by mouth every 6 hours as needed for Mild Pain.      esomeprazole (NEXIUM) 20 MG capsule Take 20 mg by mouth every evening.      albuterol 108 (90 Base) MCG/ACT Aero Soln inhalation aerosol Inhale 2 Puffs every four hours as needed for Shortness of Breath. 1 Each 5    cetirizine (ZYRTEC) 10 MG Tab Take 10 mg by mouth every evening.       No current facility-administered medications on file prior to visit.       Allergies: Other food      ROS:   Review of Systems   Constitutional:  Negative for chills, fever, malaise/fatigue and weight loss.   HENT:  Positive for congestion and sinus pain.         Hoarseness   Respiratory:  Positive for cough, sputum production and shortness of breath. Negative for wheezing.    Cardiovascular:  Negative for chest pain, palpitations and leg swelling.   Gastrointestinal:  Positive for heartburn. Negative for abdominal pain.   Genitourinary:  Negative for dysuria.   Musculoskeletal:  Negative for myalgias.   Neurological:  Negative for dizziness and headaches.   Endo/Heme/Allergies:  Positive for environmental allergies.       Vitals:  There were no vitals taken for this visit.    Physical Exam:  Physical Exam  Constitutional:       Appearance: Normal appearance.   HENT:      Head: Atraumatic.      Mouth/Throat:      Mouth: Mucous membranes are dry.   Eyes:      Extraocular Movements: Extraocular movements intact.   Cardiovascular:      Rate and Rhythm: Normal rate and regular rhythm.   Pulmonary:      Effort: Pulmonary effort is normal.      Breath sounds: No wheezing.   Abdominal:      General: Abdomen is flat.      Palpations: Abdomen is soft.   Musculoskeletal:         General: No swelling.   Skin:     General: Skin is warm and dry.   Neurological:      General: No focal deficit present.      Mental Status: She is alert and oriented to person, place, and time.         Laboratory Data:      Pertinent  Studies:    PFTs as reviewed by me personally show: none      Imaging as reviewed by me personally show:    4/7/23:  IMPRESSION:     1.  Interval resolution of previously described groundglass nodules in the LEFT upper lobe.  2.  New ill-defined parenchymal opacity in the RIGHT upper lobe, and groundglass nodules in the RIGHT lower lobe.  Appearance favors inflammatory process.    CT Thorax 9/30/22:    IMPRESSION:     Multiple groundglass nodules in the left upper lobe, largest measuring up to 7 mm.     Fleischner Society pulmonary nodule recommendations:  CT at 3-6 months. Subsequent management based on the most suspicious nodule(s).    Echo: none      Assessment/Plan:    Problem List Items Addressed This Visit    None       No follow-ups on file.     This note was generated using voice recognition software which has a chance of producing errors of grammar and possibly content.  I have made every reasonable attempt to find and correct any obvious errors, but it should be expected that some may not be found prior to finalization of this note.    Time spent in record review prior to patient arrival, reviewing results, and in face-to-face encounter totaled 30 min, excluding any procedures if performed.      Sarita Lynch MD RD  Pulmonary and Critical Care Medicine  Counts include 234 beds at the [facility]

## 2023-05-15 ENCOUNTER — TELEMEDICINE (OUTPATIENT)
Dept: SLEEP MEDICINE | Facility: MEDICAL CENTER | Age: 53
End: 2023-05-15
Attending: INTERNAL MEDICINE
Payer: COMMERCIAL

## 2023-05-15 VITALS — BODY MASS INDEX: 29.26 KG/M2 | HEIGHT: 62 IN | WEIGHT: 159 LBS

## 2023-05-15 DIAGNOSIS — J45.51 SEVERE PERSISTENT ASTHMA WITH ACUTE EXACERBATION: ICD-10-CM

## 2023-05-15 PROCEDURE — 99214 OFFICE O/P EST MOD 30 MIN: CPT | Mod: 95 | Performed by: INTERNAL MEDICINE

## 2023-05-15 RX ORDER — OMALIZUMAB 202.5 MG/1.4ML
150 INJECTION, SOLUTION SUBCUTANEOUS ONCE
Qty: 1 EACH | Refills: 0 | Status: SHIPPED | OUTPATIENT
Start: 2023-05-15 | End: 2023-06-24

## 2023-05-15 ASSESSMENT — ENCOUNTER SYMPTOMS
SINUS PAIN: 1
FEVER: 0
ABDOMINAL PAIN: 0
DIZZINESS: 0
SHORTNESS OF BREATH: 1
MYALGIAS: 0
HEARTBURN: 1
CHILLS: 0
HEADACHES: 0
WEIGHT LOSS: 0
COUGH: 0
SPUTUM PRODUCTION: 0
WHEEZING: 0
PALPITATIONS: 0
SORE THROAT: 1

## 2023-05-15 ASSESSMENT — FIBROSIS 4 INDEX: FIB4 SCORE: 0.62

## 2023-05-15 NOTE — PROGRESS NOTES
"This evaluation was conducted via Zoom using secure and encrypted videoconferencing technology. The patient was physically located at  home  in [place], [place].   The patient's identity was confirmed and verbal consent was obtained for this telemedicine encounter.     Pulmonary Clinic- Follow up    Date of Service: 4/7/23    Referring Physician: Herb Reyes D.O.    Reason for Consult: Asthma    Chief Complaint:   Chief Complaint   Patient presents with    Follow-Up     Last seen 4/7/23    Results     Pft, labs 2023       HPI:   Jennifer Benton is a 52 y.o. female who is followed in the pulmonary clinic for Asthma, last seen 10/5/22. She has had asthma since childhood and has been managed on Dulera and Albuterol.  She has seasonal allergies for which she takes zyrtec and Singulair daily.  She has had COVID twice, most recently in August of this year.  She reports having shortness of breath which has improved but not resolved.  She has a cough, mostly to clear her throat and has intermittent hoarseness depending on the time of year.  She has a significant history of GERD and takes Prilosec.  She is scheduled to see GI for possible EGD and is aware of the small hiatal hernia on her CT chest.  CT was done for \"nodules\" seen on CXR and confirmed some GGO nodules which are consistent with a resolving viral infection.  She is a never smoker and states her parents smoked in the home when she was a child so she has significant second hand smoke exposure.  She is fully vaccinated against Covid.        4/7/23:  Patient reports 2 exacerbations already this year.  Just starting to feel better.  Allergies are getting worse due to spring time.  She was prescribed Amoxicillin for one exacerbation and azithromycin and prednisone for the other.  She is still on Breo and is compliant.  Still using albuterol 1-2 times daily on a bad week and 3x a week on a good week.  Symptoms are not controlled.  She is taking a lot of allergy " medication without complete control including zyrtec, claritin and singulair.     5/15/23: Allergies are worse recently, using zyrtec, singulair and inhalers.  She is on the highest dose of Breo with a significant obstruction on PFT and significant symptoms.  IgE and Eos are both elevated.  Her asthma is certainly worse when her allergies flare up in the spring.      Eos: 390  IgE 1466    Past Medical History:   Diagnosis Date    Hyperlipidemia        No past surgical history on file.    Social History     Socioeconomic History    Marital status:      Spouse name: Not on file    Number of children: Not on file    Years of education: Not on file    Highest education level: Not on file   Occupational History    Not on file   Tobacco Use    Smoking status: Never    Smokeless tobacco: Never   Vaping Use    Vaping Use: Never used   Substance and Sexual Activity    Alcohol use: Yes     Comment: occ    Drug use: Never    Sexual activity: Yes   Other Topics Concern    Not on file   Social History Narrative    Not on file     Social Determinants of Health     Financial Resource Strain: Not on file   Food Insecurity: Not on file   Transportation Needs: Not on file   Physical Activity: Not on file   Stress: Not on file   Social Connections: Not on file   Intimate Partner Violence: Not on file   Housing Stability: Not on file          Family History   Problem Relation Age of Onset    Heart Disease Father 48    Diabetes Son         type 1       Current Outpatient Medications on File Prior to Visit   Medication Sig Dispense Refill    fluticasone furoate-vilanterol (BREO ELLIPTA) 200-25 MCG/ACT AEROSOL POWDER, BREATH ACTIVATED Inhale 1 Puff every day. Rinse mouth after use. 1 Each 12    montelukast (SINGULAIR) 10 MG Tab Take 1 Tablet by mouth every day. 90 Tablet 3    ibuprofen (MOTRIN) 200 MG Tab Take 800 mg by mouth every 6 hours as needed for Mild Pain.      esomeprazole (NEXIUM) 20 MG capsule Take 20 mg by mouth every  "evening.      albuterol 108 (90 Base) MCG/ACT Aero Soln inhalation aerosol Inhale 2 Puffs every four hours as needed for Shortness of Breath. 1 Each 5    cetirizine (ZYRTEC) 10 MG Tab Take 10 mg by mouth every evening.       No current facility-administered medications on file prior to visit.       Allergies: Other food      ROS:   Review of Systems   Constitutional:  Negative for chills, fever, malaise/fatigue and weight loss.   HENT:  Positive for congestion, sinus pain and sore throat.         Hoarseness   Respiratory:  Positive for shortness of breath. Negative for cough, sputum production and wheezing.    Cardiovascular:  Negative for chest pain, palpitations and leg swelling.   Gastrointestinal:  Positive for heartburn. Negative for abdominal pain.   Genitourinary:  Negative for dysuria.   Musculoskeletal:  Negative for myalgias.   Skin:  Positive for itching.   Neurological:  Negative for dizziness and headaches.   Endo/Heme/Allergies:  Positive for environmental allergies.       Vitals:  Ht 1.575 m (5' 2\")   Wt 72.1 kg (159 lb)     Physical Exam:  Physical Exam Deferred due to Virtual visit    Laboratory Data:      Pertinent Studies:    PFTs as reviewed by me personally show: none      Imaging as reviewed by me personally show:    4/7/23:  IMPRESSION:     1.  Interval resolution of previously described groundglass nodules in the LEFT upper lobe.  2.  New ill-defined parenchymal opacity in the RIGHT upper lobe, and groundglass nodules in the RIGHT lower lobe.  Appearance favors inflammatory process.    CT Thorax 9/30/22:    IMPRESSION:     Multiple groundglass nodules in the left upper lobe, largest measuring up to 7 mm.     Fleischner Society pulmonary nodule recommendations:  CT at 3-6 months. Subsequent management based on the most suspicious nodule(s).    Echo: none      Assessment/Plan:    Problem List Items Addressed This Visit       Severe persistent asthma with acute exacerbation     Patient with " daily symptoms requiring albuterol.  S/p several exacerbations, one resolved with abx and the other with abx and prednisone.  She is compliant with her Breo 200 which helps.  She is using zyrtec daily and occasional claritin with significant allergy symptoms.  She also uses singulair.  She has seen allergists in the past and had allergy shots without improvement.  Significant elevation of IgE and Eosinophils   Plan:  - Start Xolair  - Continue Breo 200  - Continue albuterol as needed  - Continue zyrtec and claritin for allergies and singulair at night  - Refer to allergist  - Follow up in August with Dr. Lynch           Relevant Medications    omalizumab (XOLAIR) 150 MG    Other Relevant Orders    Referral to Allergy        Return in about 3 months (around 8/15/2023) for Dr. Lynch .     This note was generated using voice recognition software which has a chance of producing errors of grammar and possibly content.  I have made every reasonable attempt to find and correct any obvious errors, but it should be expected that some may not be found prior to finalization of this note.    Time spent in record review prior to patient arrival, reviewing results, and in encounter totaled 30 min, excluding any procedures if performed.      Sarita Lynch MD RD  Pulmonary and Critical Care Medicine  [facility]

## 2023-05-15 NOTE — ASSESSMENT & PLAN NOTE
Patient with daily symptoms requiring albuterol.  S/p several exacerbations, one resolved with abx and the other with abx and prednisone.  She is compliant with her Breo 200 which helps.  She is using zyrtec daily and occasional claritin with significant allergy symptoms.  She also uses singulair.  She has seen allergists in the past and had allergy shots without improvement.  Significant elevation of IgE and Eosinophils   Plan:  - Start Xolair  - Continue Breo 200  - Continue albuterol as needed  - Continue zyrtec and claritin for allergies and singulair at night  - Refer to allergist  - Follow up in August with Dr. Lynch

## 2023-05-16 ENCOUNTER — TELEPHONE (OUTPATIENT)
Dept: SLEEP MEDICINE | Facility: MEDICAL CENTER | Age: 53
End: 2023-05-16
Payer: COMMERCIAL

## 2023-05-16 NOTE — TELEPHONE ENCOUNTER
Received request for Xolair, ran a test claim and a PA is required.    Submitted PA via CoverMyMeds key is [id number]

## 2023-05-17 NOTE — TELEPHONE ENCOUNTER
PA has been denied due to patient not having a positive skin prick test or RAST on file.     Is patient able to get this done? Once done we can appeal the insurance's decision

## 2023-05-28 DIAGNOSIS — J45.40 MODERATE PERSISTENT ASTHMA WITHOUT COMPLICATION: ICD-10-CM

## 2023-05-30 RX ORDER — MONTELUKAST SODIUM 10 MG/1
10 TABLET ORAL DAILY
Qty: 90 TABLET | Refills: 3 | Status: SHIPPED | OUTPATIENT
Start: 2023-05-30

## 2023-05-30 NOTE — TELEPHONE ENCOUNTER
Received request via: Pharmacy    Was the patient seen in the last year in this department? Yes    Does the patient have an active prescription (recently filled or refills available) for medication(s) requested?  yes    Does the patient have [facility] and need 100 day supply (blood pressure, diabetes and cholesterol meds only)? Patient does not have SCP  Requested Prescriptions    Pending Prescriptions Disp Refills   montelukast (SINGULAIR) 10 MG Tab [Pharmacy Med Name: MONTELUKAST SOD 10 MG TABLET] 90 Tablet 3    Sig: TAKE 1 TABLET BY MOUTH EVERY DAY

## 2023-08-20 ASSESSMENT — ENCOUNTER SYMPTOMS
DIZZINESS: 0
FEVER: 0
PALPITATIONS: 0
SPUTUM PRODUCTION: 0
WEIGHT LOSS: 0
CHILLS: 0
ABDOMINAL PAIN: 0
MYALGIAS: 0
COUGH: 0
HEARTBURN: 1
HEADACHES: 0
WHEEZING: 0

## 2023-08-21 ENCOUNTER — TELEMEDICINE (OUTPATIENT)
Dept: SLEEP MEDICINE | Facility: MEDICAL CENTER | Age: 53
End: 2023-08-21
Attending: INTERNAL MEDICINE
Payer: COMMERCIAL

## 2023-08-21 VITALS — HEIGHT: 62 IN | WEIGHT: 151 LBS | BODY MASS INDEX: 27.79 KG/M2

## 2023-08-21 DIAGNOSIS — J45.51 SEVERE PERSISTENT ASTHMA WITH ACUTE EXACERBATION: ICD-10-CM

## 2023-08-21 PROCEDURE — 99214 OFFICE O/P EST MOD 30 MIN: CPT | Mod: 95 | Performed by: INTERNAL MEDICINE

## 2023-08-21 ASSESSMENT — FIBROSIS 4 INDEX: FIB4 SCORE: 0.62

## 2023-08-21 ASSESSMENT — ENCOUNTER SYMPTOMS
SORE THROAT: 0
SINUS PAIN: 0
SHORTNESS OF BREATH: 0

## 2023-08-21 NOTE — ASSESSMENT & PLAN NOTE
Much improved currently on Breo. Patient with springtime daily symptoms requiring daily albuterol.  S/p several exacerbations, one resolved with abx and the other with abx and prednisone.  She is compliant with her Breo 200 which helps.  She is using zyrtec daily and occasional claritin with significant allergy symptoms.  She also uses singulair.  She has seen allergists in the past and had allergy shots without improvement.  Significant elevation of IgE and Eosinophils   Plan:  - Plan for RAST testing with allergy, resubmit biologic (Xolair or Nucala) after RAST testing  - Continue Breo 200  - Continue albuterol as needed  - Continue zyrtec and claritin for allergies and singulair at night  - Follow up in December

## 2023-08-21 NOTE — PROGRESS NOTES
"This evaluation was conducted via Zoom using secure and encrypted videoconferencing technology. The patient was physically located at  home  in [place], [place].   The patient's identity was confirmed and verbal consent was obtained for this telemedicine encounter.     Pulmonary Clinic- Follow up    Date of Service: 8/21/23    Referring Physician: Herb Reyes D.O.    Reason for Consult: Asthma    Chief Complaint:   Chief Complaint   Patient presents with    Follow-Up     Last seen 4/7/23 with Dr. Lynch for Severe persistent asthma with acute exacerbation       Results     PFT 4/24/23, Labs 5/9/23       HPI:   Jennifer Benton is a 52 y.o. female who is followed in the pulmonary clinic for Asthma, last seen 4/7/23. She has had asthma since childhood and has been managed on Dulera and Albuterol.  She has seasonal allergies for which she takes zyrtec and Singulair daily.  She has had COVID twice, most recently in August of this year.  She reports having shortness of breath which has improved but not resolved.  She has a cough, mostly to clear her throat and has intermittent hoarseness depending on the time of year.  She has a significant history of GERD and takes Prilosec.  She is scheduled to see GI for possible EGD and is aware of the small hiatal hernia on her CT chest.  CT was done for \"nodules\" seen on CXR and confirmed some GGO nodules which are consistent with a resolving viral infection.  She is a never smoker and states her parents smoked in the home when she was a child so she has significant second hand smoke exposure.  She is fully vaccinated against Covid.        4/7/23:  Patient reports 2 exacerbations already this year.  Just starting to feel better.  Allergies are getting worse due to spring time.  She was prescribed Amoxicillin for one exacerbation and azithromycin and prednisone for the other.  She is still on Breo and is compliant.  Still using albuterol 1-2 times daily on a bad week and 3x a week on " What Type Of Note Output Would You Prefer (Optional)?: Bullet Format How Severe Is Your Skin Lesion?: mild a good week.  Symptoms are not controlled.  She is taking a lot of allergy medication without complete control including zyrtec, claritin and singulair.     5/15/23: Allergies are worse recently, using zyrtec, singulair and inhalers.  She is on the highest dose of Breo with a significant obstruction on PFT and significant symptoms.  IgE and Eos are both elevated.  Her asthma is certainly worse when her allergies flare up in the spring.      Eos: 390  IgE 1466    8/21/23: Today Jennifer is doing much better. She states she is not needing albuterol more than 1-2 times a week and no longer needing with exercise.  We ordered Xolair which has been denied due to needing RAST testing.  Allergy referral has been submitted but Jennifer has had some scheduling difficulties.  She plans to call them back and get an appointment for this testing so we can proceed with biologic in time for next winter/spring when her asthma is at its worst.  She denies cough or SOB.      Past Medical History:   Diagnosis Date    Hyperlipidemia        History reviewed. No pertinent surgical history.    Social History     Socioeconomic History    Marital status:      Spouse name: Not on file    Number of children: Not on file    Years of education: Not on file    Highest education level: Not on file   Occupational History    Not on file   Tobacco Use    Smoking status: Never    Smokeless tobacco: Never   Vaping Use    Vaping Use: Never used   Substance and Sexual Activity    Alcohol use: Yes     Comment: occ    Drug use: Never    Sexual activity: Yes   Other Topics Concern    Not on file   Social History Narrative    Not on file     Social Determinants of Health     Financial Resource Strain: Not on file   Food Insecurity: Not on file   Transportation Needs: Not on file   Physical Activity: Not on file   Stress: Not on file   Social Connections: Not on file   Intimate Partner Violence: Not on file   Housing Stability: Not on file          Family  "History   Problem Relation Age of Onset    Heart Disease Father 48    Diabetes Son         type 1       Current Outpatient Medications on File Prior to Visit   Medication Sig Dispense Refill    montelukast (SINGULAIR) 10 MG Tab TAKE 1 TABLET BY MOUTH EVERY DAY 90 Tablet 3    fluticasone furoate-vilanterol (BREO ELLIPTA) 200-25 MCG/ACT AEROSOL POWDER, BREATH ACTIVATED Inhale 1 Puff every day. Rinse mouth after use. 1 Each 12    ibuprofen (MOTRIN) 200 MG Tab Take 800 mg by mouth every 6 hours as needed for Mild Pain.      esomeprazole (NEXIUM) 20 MG capsule Take 20 mg by mouth every evening.      albuterol 108 (90 Base) MCG/ACT Aero Soln inhalation aerosol Inhale 2 Puffs every four hours as needed for Shortness of Breath. 1 Each 5    cetirizine (ZYRTEC) 10 MG Tab Take 10 mg by mouth every evening.       No current facility-administered medications on file prior to visit.       Allergies: Other food      ROS:   Review of Systems   Constitutional:  Negative for chills, fever, malaise/fatigue and weight loss.   HENT:  Negative for congestion, sinus pain and sore throat.         Hoarseness   Respiratory:  Negative for cough, sputum production, shortness of breath and wheezing.    Cardiovascular:  Negative for chest pain, palpitations and leg swelling.   Gastrointestinal:  Positive for heartburn. Negative for abdominal pain.   Genitourinary:  Negative for dysuria.   Musculoskeletal:  Negative for myalgias.   Skin:  Negative for itching.   Neurological:  Negative for dizziness and headaches.   Endo/Heme/Allergies:  Positive for environmental allergies.       Vitals:  Ht 1.575 m (5' 2\")   Wt 68.5 kg (151 lb)     Physical Exam:  Physical Exam Deferred due to Virtual visit    Laboratory Data:      Pertinent Studies:    PFTs as reviewed by me personally show: none      Imaging as reviewed by me personally show:    4/7/23:  IMPRESSION:     1.  Interval resolution of previously described groundglass nodules in the LEFT upper " Has Your Skin Lesion Been Treated?: not been treated Is This A New Presentation, Or A Follow-Up?: Skin Lesion lobe.  2.  New ill-defined parenchymal opacity in the RIGHT upper lobe, and groundglass nodules in the RIGHT lower lobe.  Appearance favors inflammatory process.    CT Thorax 9/30/22:    IMPRESSION:     Multiple groundglass nodules in the left upper lobe, largest measuring up to 7 mm.     Fleischner Society pulmonary nodule recommendations:  CT at 3-6 months. Subsequent management based on the most suspicious nodule(s).    Echo: none      Assessment/Plan:    Problem List Items Addressed This Visit       Severe persistent asthma with acute exacerbation     Much improved currently on Breo. Patient with springtime daily symptoms requiring daily albuterol.  S/p several exacerbations, one resolved with abx and the other with abx and prednisone.  She is compliant with her Breo 200 which helps.  She is using zyrtec daily and occasional claritin with significant allergy symptoms.  She also uses singulair.  She has seen allergists in the past and had allergy shots without improvement.  Significant elevation of IgE and Eosinophils   Plan:  - Plan for RAST testing with allergy, resubmit biologic (Xolair or Nucala) after RAST testing  - Continue Breo 200  - Continue albuterol as needed  - Continue zyrtec and claritin for allergies and singulair at night  - Follow up in December             Return in about 3 months (around 11/21/2023).     This note was generated using voice recognition software which has a chance of producing errors of grammar and possibly content.  I have made every reasonable attempt to find and correct any obvious errors, but it should be expected that some may not be found prior to finalization of this note.    Time spent in record review prior to patient arrival, reviewing results, and in encounter totaled 30 min, excluding any procedures if performed.      Sarita Lynch MD RD  Pulmonary and Critical Care Medicine  [facility]

## 2023-08-24 ENCOUNTER — PATIENT MESSAGE (OUTPATIENT)
Dept: SLEEP MEDICINE | Facility: MEDICAL CENTER | Age: 53
End: 2023-08-24
Payer: COMMERCIAL

## 2024-04-03 ENCOUNTER — TELEPHONE (OUTPATIENT)
Dept: SLEEP MEDICINE | Facility: MEDICAL CENTER | Age: 54
End: 2024-04-03
Payer: COMMERCIAL

## 2024-04-03 NOTE — TELEPHONE ENCOUNTER
Caller: Jennifer Benton    Topic/issue: Requesting call back regarding a refill     Callback Number: [phone]    Thank You   Grace PEACOCK

## 2024-04-05 ENCOUNTER — APPOINTMENT (OUTPATIENT)
Dept: MEDICAL GROUP | Facility: LAB | Age: 54
End: 2024-04-05
Payer: COMMERCIAL

## 2024-04-05 ENCOUNTER — OFFICE VISIT (OUTPATIENT)
Dept: MEDICAL GROUP | Facility: LAB | Age: 54
End: 2024-04-05
Payer: COMMERCIAL

## 2024-04-05 VITALS
OXYGEN SATURATION: 95 % | BODY MASS INDEX: 28.71 KG/M2 | RESPIRATION RATE: 12 BRPM | WEIGHT: 156 LBS | SYSTOLIC BLOOD PRESSURE: 110 MMHG | TEMPERATURE: 97.3 F | HEIGHT: 62 IN | DIASTOLIC BLOOD PRESSURE: 76 MMHG | HEART RATE: 90 BPM

## 2024-04-05 DIAGNOSIS — Z00.00 ANNUAL PHYSICAL EXAM: ICD-10-CM

## 2024-04-05 DIAGNOSIS — J45.51 SEVERE PERSISTENT ASTHMA WITH ACUTE EXACERBATION: ICD-10-CM

## 2024-04-05 DIAGNOSIS — Z12.31 ENCOUNTER FOR SCREENING MAMMOGRAM FOR MALIGNANT NEOPLASM OF BREAST: ICD-10-CM

## 2024-04-05 DIAGNOSIS — Z12.11 COLON CANCER SCREENING: ICD-10-CM

## 2024-04-05 PROBLEM — R05.1 ACUTE COUGH: Status: RESOLVED | Noted: 2022-09-07 | Resolved: 2024-04-05

## 2024-04-05 PROCEDURE — 99396 PREV VISIT EST AGE 40-64: CPT | Performed by: STUDENT IN AN ORGANIZED HEALTH CARE EDUCATION/TRAINING PROGRAM

## 2024-04-05 PROCEDURE — 3078F DIAST BP <80 MM HG: CPT | Performed by: STUDENT IN AN ORGANIZED HEALTH CARE EDUCATION/TRAINING PROGRAM

## 2024-04-05 PROCEDURE — 3074F SYST BP LT 130 MM HG: CPT | Performed by: STUDENT IN AN ORGANIZED HEALTH CARE EDUCATION/TRAINING PROGRAM

## 2024-04-05 RX ORDER — ESTRADIOL 0.04 MG/D
FILM, EXTENDED RELEASE TRANSDERMAL
COMMUNITY
Start: 2024-03-26

## 2024-04-05 RX ORDER — PROGESTERONE 100 MG/1
100 CAPSULE ORAL
COMMUNITY
Start: 2024-03-20 | End: 2024-04-12

## 2024-04-05 RX ORDER — FLUTICASONE FUROATE AND VILANTEROL 200; 25 UG/1; UG/1
1 POWDER RESPIRATORY (INHALATION) DAILY
Qty: 1 EACH | Refills: 12 | Status: SHIPPED | OUTPATIENT
Start: 2024-04-05

## 2024-04-05 ASSESSMENT — ENCOUNTER SYMPTOMS
FEVER: 0
CHILLS: 0
SHORTNESS OF BREATH: 0

## 2024-04-05 ASSESSMENT — FIBROSIS 4 INDEX: FIB4 SCORE: 0.64

## 2024-04-05 ASSESSMENT — PATIENT HEALTH QUESTIONNAIRE - PHQ9: CLINICAL INTERPRETATION OF PHQ2 SCORE: 0

## 2024-04-05 NOTE — PROGRESS NOTES
Subjective:     CC:   Chief Complaint   Patient presents with    Annual Exam       HPI:   Jennifer Benton is a 53 y.o. female who presents for annual exam    Patient has GYN provider: Yes  Last Pap Smear: patient unable to recall    H/O Abnormal Pap: No  Last Mammogram: 2021  Last Bone Density Test: NA  Last Colorectal Cancer Screening: NA  Last Tdap: 2019  Received HPV series: No    Exercise: walking, no weight bearing   Diet: average, balanced diet     No LMP recorded. Patient is postmenopausal.  She has not utilized hormone replacement therapy.  Reports mood changes.  No significant bloating/fluid retention, pelvic pain, or dyspareunia. No abnormal vaginal discharge.   No breast tenderness, mass, nipple discharge or changes in size or contour.    OB History   No obstetric history on file.      She  reports being sexually active.    She  has a past medical history of Hyperlipidemia.    She has no past medical history of Painful breathing, Shortness of breath, or Wheezing.  She  has no past surgical history on file.    Family History   Problem Relation Age of Onset    Heart Disease Father 48    Diabetes Son         type 1     Social History     Tobacco Use    Smoking status: Never    Smokeless tobacco: Never   Vaping Use    Vaping Use: Never used   Substance Use Topics    Alcohol use: Yes     Comment: occ    Drug use: Never       Patient Active Problem List    Diagnosis Date Noted    Acute non-recurrent pansinusitis 02/24/2023    Pulmonary nodules 10/05/2022    Dyslipidemia 06/14/2022    Family history of early CAD 11/17/2021    Anxiety and depression 11/11/2021    Chronic pansinusitis 10/11/2021    Acquired deformity of nail 03/02/2021    Essential hypertension 12/10/2020    Gastroesophageal reflux disease without esophagitis 10/23/2019    Severe persistent asthma with acute exacerbation 10/23/2019     Current Outpatient Medications   Medication Sig Dispense Refill    estradiol (VIVELLE) 0.0375 MG/24HR patch 1  "PATCH TO CLEAN SKIN SEMI WEEKLY      progesterone (PROMETRIUM) 100 MG Cap Take 100 mg by mouth at bedtime.      fluticasone furoate-vilanterol (BREO ELLIPTA) 200-25 MCG/ACT AEROSOL POWDER, BREATH ACTIVATED Inhale 1 Puff every day. Rinse mouth after use. 1 Each 12    montelukast (SINGULAIR) 10 MG Tab TAKE 1 TABLET BY MOUTH EVERY DAY 90 Tablet 3    ibuprofen (MOTRIN) 200 MG Tab Take 800 mg by mouth every 6 hours as needed for Mild Pain.      esomeprazole (NEXIUM) 20 MG capsule Take 20 mg by mouth every evening.      albuterol 108 (90 Base) MCG/ACT Aero Soln inhalation aerosol Inhale 2 Puffs every four hours as needed for Shortness of Breath. 1 Each 5    cetirizine (ZYRTEC) 10 MG Tab Take 10 mg by mouth every evening.       No current facility-administered medications for this visit.     Allergies   Allergen Reactions    Other Food      Walnuts, pt reports that her tongue burns          Review of Systems   Review of Systems   Constitutional:  Negative for chills and fever.   Respiratory:  Negative for shortness of breath.    Cardiovascular:  Negative for chest pain.         Objective:   /76   Pulse 90   Temp 36.3 °C (97.3 °F) (Temporal)   Resp 12   Ht 1.575 m (5' 2\")   Wt 70.8 kg (156 lb)   SpO2 95%   BMI 28.53 kg/m²     Wt Readings from Last 4 Encounters:   04/05/24 70.8 kg (156 lb)   08/21/23 68.5 kg (151 lb)   05/15/23 72.1 kg (159 lb)   04/24/23 72.5 kg (159 lb 12.8 oz)           Physical Exam:  Physical Exam  Constitutional:       General: She is not in acute distress.     Appearance: She is not ill-appearing.   Pulmonary:      Effort: Pulmonary effort is normal.   Neurological:      Mental Status: She is alert.   Psychiatric:         Mood and Affect: Mood normal.         Behavior: Behavior normal.         Thought Content: Thought content normal.         Judgment: Judgment normal.           Assessment and Plan:     1. Colon cancer screening  - Referral to GI for Colonoscopy    2. Annual physical " exam  - VITAMIN D,25 HYDROXY (DEFICIENCY); Future  - HEMOGLOBIN A1C; Future  - CBC WITH DIFFERENTIAL; Future  - Comp Metabolic Panel; Future  - TSH WITH REFLEX TO FT4; Future  - Lipid Profile; Future    3. Encounter for screening mammogram for malignant neoplasm of breast  - MA-SCREENING MAMMO BILAT W/TOMOSYNTHESIS W/CAD; Future    4. Severe persistent asthma with acute exacerbation  - fluticasone furoate-vilanterol (BREO ELLIPTA) 200-25 MCG/ACT AEROSOL POWDER, BREATH ACTIVATED; Inhale 1 Puff every day. Rinse mouth after use.  Dispense: 1 Each; Refill: 12    Other orders  - estradiol (VIVELLE) 0.0375 MG/24HR patch; 1 PATCH TO CLEAN SKIN SEMI WEEKLY  - progesterone (PROMETRIUM) 100 MG Cap; Take 100 mg by mouth at bedtime.      Health maintenance:    Labs per orders  Immunizations per orders  Age-appropriate guidance discussed including diet and exercise  Discussed  diet and exercise     Follow-up: 1 year annual

## 2024-04-12 ENCOUNTER — OFFICE VISIT (OUTPATIENT)
Dept: SLEEP MEDICINE | Facility: MEDICAL CENTER | Age: 54
End: 2024-04-12
Attending: INTERNAL MEDICINE
Payer: COMMERCIAL

## 2024-04-12 VITALS
HEART RATE: 83 BPM | SYSTOLIC BLOOD PRESSURE: 124 MMHG | DIASTOLIC BLOOD PRESSURE: 70 MMHG | RESPIRATION RATE: 19 BRPM | OXYGEN SATURATION: 93 % | BODY MASS INDEX: 28.52 KG/M2 | HEIGHT: 62 IN | WEIGHT: 155 LBS

## 2024-04-12 DIAGNOSIS — J45.50 SEVERE PERSISTENT ASTHMA WITHOUT COMPLICATION: ICD-10-CM

## 2024-04-12 PROCEDURE — 99212 OFFICE O/P EST SF 10 MIN: CPT | Performed by: INTERNAL MEDICINE

## 2024-04-12 PROCEDURE — 99213 OFFICE O/P EST LOW 20 MIN: CPT | Performed by: INTERNAL MEDICINE

## 2024-04-12 PROCEDURE — 3074F SYST BP LT 130 MM HG: CPT | Performed by: INTERNAL MEDICINE

## 2024-04-12 PROCEDURE — 3078F DIAST BP <80 MM HG: CPT | Performed by: INTERNAL MEDICINE

## 2024-04-12 ASSESSMENT — ENCOUNTER SYMPTOMS
RESPIRATORY NEGATIVE: 1
GASTROINTESTINAL NEGATIVE: 1
CONSTITUTIONAL NEGATIVE: 1
PSYCHIATRIC NEGATIVE: 1
MUSCULOSKELETAL NEGATIVE: 1
CARDIOVASCULAR NEGATIVE: 1
EYES NEGATIVE: 1
NEUROLOGICAL NEGATIVE: 1

## 2024-04-12 ASSESSMENT — FIBROSIS 4 INDEX: FIB4 SCORE: 0.64

## 2024-04-12 NOTE — PROGRESS NOTES
Pulmonary Clinic follow up    Date of Service: 4/12/2024    Reason for follow up:  Follow-Up (Last seen 08/21/23 with Dr. Lynch for Severe persistent asthma with acute exacerbation )      Problem List Items Addressed This Visit       Severe persistent asthma without complication     Pt has not had an exacerbation in the last 6 months.  In reviewing Biologics and benefits of Biologics reviewed it is really to prevent exacerbations and steroid use as well as ER visits and hospitalizations.  As she has been stable over the last 6 months this is very reassuring and so would like to reevaluate in 3 months.  So at this time Biologics not recommended.  Continue Breo and albuterol  Follow-up in 3 months              History of Present Illness: Jennifer Benton is a 53 y.o. female with a past medical history of Severe persistent asthma on Breo and albuterol.  Last seen in August 2023.  At that time Xolair had been prescribed due to 2 exacerbations in the year but denied due to needing RAST testing.  Patient was post get RAST testing and then follow-up.    Never smoker but exposed to secondhand smoke    Last IgE May 2023 was 1466  Last PFTs April 2023 shows FEV1 1.56 L [69%] and FEV1 over FVC ratio of 56%.  Bronchodilator response.  Evidence of air trapping and normal gas transfer      Since last visit no exacerbation has not needed steroids and is using her rescue inhaler once or twice a week.  Couple of weeks ago with the weather change she did need it every day but since then none.  No nighttime symptoms.       Review of Systems   Constitutional: Negative.    HENT: Negative.     Eyes: Negative.    Respiratory: Negative.     Cardiovascular: Negative.    Gastrointestinal: Negative.    Genitourinary: Negative.    Musculoskeletal: Negative.    Skin: Negative.    Neurological: Negative.    Endo/Heme/Allergies: Negative.    Psychiatric/Behavioral: Negative.         Current Outpatient Medications on File Prior to Visit  "  Medication Sig Dispense Refill    estradiol (VIVELLE) 0.0375 MG/24HR patch 1 PATCH TO CLEAN SKIN SEMI WEEKLY      fluticasone furoate-vilanterol (BREO ELLIPTA) 200-25 MCG/ACT AEROSOL POWDER, BREATH ACTIVATED Inhale 1 Puff every day. Rinse mouth after use. 1 Each 12    montelukast (SINGULAIR) 10 MG Tab TAKE 1 TABLET BY MOUTH EVERY DAY 90 Tablet 3    ibuprofen (MOTRIN) 200 MG Tab Take 800 mg by mouth every 6 hours as needed for Mild Pain.      esomeprazole (NEXIUM) 20 MG capsule Take 20 mg by mouth every evening.      albuterol 108 (90 Base) MCG/ACT Aero Soln inhalation aerosol Inhale 2 Puffs every four hours as needed for Shortness of Breath. 1 Each 5    cetirizine (ZYRTEC) 10 MG Tab Take 10 mg by mouth every evening.       No current facility-administered medications on file prior to visit.       Social History     Tobacco Use    Smoking status: Never    Smokeless tobacco: Never   Vaping Use    Vaping Use: Never used   Substance Use Topics    Alcohol use: Yes     Comment: occ    Drug use: Never        Past Medical History:   Diagnosis Date    Hyperlipidemia     Shortness of breath     Wheezing        History reviewed. No pertinent surgical history.    Allergies: Other food    Family History   Problem Relation Age of Onset    Heart Disease Father 48    Diabetes Son         type 1       Vitals:    04/12/24 0854   Height: 1.575 m (5' 2\")   Weight: 70.3 kg (155 lb)   Weight % change since last entry.: 0 %   BP: 124/70   Pulse: 83   BMI (Calculated): 28.35   Resp: 19       Physical Examination  Physical Exam  HENT:      Head: Normocephalic and atraumatic.      Mouth/Throat:      Mouth: Mucous membranes are moist.   Eyes:      Extraocular Movements: Extraocular movements intact.      Pupils: Pupils are equal, round, and reactive to light.   Cardiovascular:      Rate and Rhythm: Normal rate.   Pulmonary:      Effort: Pulmonary effort is normal. No respiratory distress.      Breath sounds: Normal breath sounds. No " wheezing.   Musculoskeletal:      Cervical back: Normal range of motion.   Neurological:      General: No focal deficit present.      Mental Status: She is alert and oriented to person, place, and time.   Psychiatric:         Mood and Affect: Mood normal.         Behavior: Behavior normal.         Thought Content: Thought content normal.         Judgment: Judgment normal.         Melissa Aleman M.D., MD [facility]  [facility]

## 2024-04-15 ENCOUNTER — HOSPITAL ENCOUNTER (OUTPATIENT)
Dept: RADIOLOGY | Facility: MEDICAL CENTER | Age: 54
End: 2024-04-15
Attending: STUDENT IN AN ORGANIZED HEALTH CARE EDUCATION/TRAINING PROGRAM
Payer: COMMERCIAL

## 2024-04-15 DIAGNOSIS — Z12.31 ENCOUNTER FOR SCREENING MAMMOGRAM FOR MALIGNANT NEOPLASM OF BREAST: ICD-10-CM

## 2024-04-15 PROCEDURE — 77067 SCR MAMMO BI INCL CAD: CPT

## 2024-06-24 DIAGNOSIS — J45.40 MODERATE PERSISTENT ASTHMA WITHOUT COMPLICATION: ICD-10-CM

## 2024-06-25 RX ORDER — MONTELUKAST SODIUM 10 MG/1
10 TABLET ORAL DAILY
Qty: 90 TABLET | Refills: 3 | Status: SHIPPED | OUTPATIENT
Start: 2024-06-25

## 2024-09-18 ENCOUNTER — OFFICE VISIT (OUTPATIENT)
Dept: MEDICAL GROUP | Facility: LAB | Age: 54
End: 2024-09-18
Payer: COMMERCIAL

## 2024-09-18 VITALS
WEIGHT: 154.54 LBS | RESPIRATION RATE: 16 BRPM | SYSTOLIC BLOOD PRESSURE: 128 MMHG | OXYGEN SATURATION: 94 % | TEMPERATURE: 97.5 F | DIASTOLIC BLOOD PRESSURE: 88 MMHG | HEIGHT: 62 IN | HEART RATE: 80 BPM | BODY MASS INDEX: 28.44 KG/M2

## 2024-09-18 DIAGNOSIS — E78.5 DYSLIPIDEMIA: ICD-10-CM

## 2024-09-18 DIAGNOSIS — I10 ESSENTIAL HYPERTENSION: ICD-10-CM

## 2024-09-18 PROCEDURE — 3074F SYST BP LT 130 MM HG: CPT | Performed by: STUDENT IN AN ORGANIZED HEALTH CARE EDUCATION/TRAINING PROGRAM

## 2024-09-18 PROCEDURE — 99214 OFFICE O/P EST MOD 30 MIN: CPT | Performed by: STUDENT IN AN ORGANIZED HEALTH CARE EDUCATION/TRAINING PROGRAM

## 2024-09-18 PROCEDURE — 3079F DIAST BP 80-89 MM HG: CPT | Performed by: STUDENT IN AN ORGANIZED HEALTH CARE EDUCATION/TRAINING PROGRAM

## 2024-09-18 ASSESSMENT — ENCOUNTER SYMPTOMS
CHILLS: 0
FEVER: 0
SHORTNESS OF BREATH: 0

## 2024-09-18 NOTE — PROGRESS NOTES
"Subjective:     CC:   Chief Complaint   Patient presents with    Hypertension        HPI:   History of Present Illness         Problem   Essential Hypertension    She is currently taking hydrochlorothiazide and losartan 25 mg and controlled. She feels the HCZT is not helping    9/18/24  -patient reports her DBP has been running in the 's. She is not taking the losartan 25mg because she felt it was not helping          Current Outpatient Medications Ordered in Epic   Medication Sig Dispense Refill    montelukast (SINGULAIR) 10 MG Tab Take 1 Tablet by mouth every day. 90 Tablet 3    estradiol (VIVELLE) 0.0375 MG/24HR patch 1 PATCH TO CLEAN SKIN SEMI WEEKLY      fluticasone furoate-vilanterol (BREO ELLIPTA) 200-25 MCG/ACT AEROSOL POWDER, BREATH ACTIVATED Inhale 1 Puff every day. Rinse mouth after use. 1 Each 12    ibuprofen (MOTRIN) 200 MG Tab Take 800 mg by mouth every 6 hours as needed for Mild Pain.      esomeprazole (NEXIUM) 20 MG capsule Take 20 mg by mouth every evening.      albuterol 108 (90 Base) MCG/ACT Aero Soln inhalation aerosol Inhale 2 Puffs every four hours as needed for Shortness of Breath. 1 Each 5    cetirizine (ZYRTEC) 10 MG Tab Take 10 mg by mouth every evening.       No current Epic-ordered facility-administered medications on file.           ROS:  Review of Systems   Constitutional:  Negative for chills and fever.   Respiratory:  Negative for shortness of breath.    Cardiovascular:  Negative for chest pain.       Objective:     Exam:  /88 (BP Location: Right arm, Patient Position: Sitting, BP Cuff Size: Adult)   Pulse 80   Temp 36.4 °C (97.5 °F) (Temporal)   Resp 16   Ht 1.575 m (5' 2\")   Wt 70.1 kg (154 lb 8.7 oz)   SpO2 94%   BMI 28.27 kg/m²  Body mass index is 28.27 kg/m².    Physical Exam  Constitutional:       General: She is not in acute distress.     Appearance: She is not ill-appearing.   Pulmonary:      Effort: Pulmonary effort is normal.   Neurological:      Mental " Status: She is alert.   Psychiatric:         Mood and Affect: Mood normal.         Behavior: Behavior normal.         Thought Content: Thought content normal.         Judgment: Judgment normal.                   Assessment & Plan:     Problem List Items Addressed This Visit       Dyslipidemia    Relevant Orders    NMR LIPOPROFILE    APOLIPOPROTEIN B    Essential hypertension     Chronic-not controlled  -increase losartan to 50mg                 Please note that this dictation was created using voice recognition software. I have made every reasonable attempt to correct obvious errors, but I expect that there are errors of grammar and possibly content that I did not discover before finalizing the note.

## 2024-09-24 ENCOUNTER — TELEPHONE (OUTPATIENT)
Dept: MEDICAL GROUP | Facility: LAB | Age: 54
End: 2024-09-24
Payer: COMMERCIAL

## 2024-09-24 DIAGNOSIS — I10 ESSENTIAL HYPERTENSION: ICD-10-CM

## 2024-09-24 RX ORDER — LOSARTAN POTASSIUM 50 MG/1
50 TABLET ORAL DAILY
Qty: 60 TABLET | Refills: 1 | Status: SHIPPED | OUTPATIENT
Start: 2024-09-24

## 2024-09-24 NOTE — TELEPHONE ENCOUNTER
Patient LVM asking if you are going to send in losartan for her blood pressure? Please send to [facility]

## 2025-01-28 ENCOUNTER — OFFICE VISIT (OUTPATIENT)
Dept: MEDICAL GROUP | Facility: LAB | Age: 55
End: 2025-01-28
Payer: COMMERCIAL

## 2025-01-28 VITALS
WEIGHT: 158.51 LBS | BODY MASS INDEX: 29.17 KG/M2 | RESPIRATION RATE: 18 BRPM | HEART RATE: 80 BPM | HEIGHT: 62 IN | OXYGEN SATURATION: 95 % | SYSTOLIC BLOOD PRESSURE: 128 MMHG | DIASTOLIC BLOOD PRESSURE: 80 MMHG | TEMPERATURE: 97 F

## 2025-01-28 DIAGNOSIS — M79.645 PAIN OF FINGER OF LEFT HAND: ICD-10-CM

## 2025-01-28 DIAGNOSIS — Z12.83 SKIN CANCER SCREENING: ICD-10-CM

## 2025-01-28 DIAGNOSIS — R07.89 ATYPICAL CHEST PAIN: ICD-10-CM

## 2025-01-28 DIAGNOSIS — I10 ESSENTIAL HYPERTENSION: ICD-10-CM

## 2025-01-28 PROCEDURE — 99214 OFFICE O/P EST MOD 30 MIN: CPT | Performed by: STUDENT IN AN ORGANIZED HEALTH CARE EDUCATION/TRAINING PROGRAM

## 2025-01-28 PROCEDURE — 3074F SYST BP LT 130 MM HG: CPT | Performed by: STUDENT IN AN ORGANIZED HEALTH CARE EDUCATION/TRAINING PROGRAM

## 2025-01-28 PROCEDURE — 3079F DIAST BP 80-89 MM HG: CPT | Performed by: STUDENT IN AN ORGANIZED HEALTH CARE EDUCATION/TRAINING PROGRAM

## 2025-01-28 RX ORDER — LOSARTAN POTASSIUM 50 MG/1
50 TABLET ORAL DAILY
Qty: 90 TABLET | Refills: 3 | Status: SHIPPED | OUTPATIENT
Start: 2025-01-28

## 2025-01-28 ASSESSMENT — ENCOUNTER SYMPTOMS
SHORTNESS OF BREATH: 0
CHILLS: 0
FEVER: 0

## 2025-01-28 ASSESSMENT — PATIENT HEALTH QUESTIONNAIRE - PHQ9: CLINICAL INTERPRETATION OF PHQ2 SCORE: 0

## 2025-01-28 NOTE — PROGRESS NOTES
Subjective:     CC:   Chief Complaint   Patient presents with    Chest Pain     3 times since New yrs        HPI:   History of Present Illness  The patient is a 54-year-old female who presents for evaluation of chest pain, skin lesions, and trigger finger.    She experienced an episode of right-sided neck and arm pain during a hike on New Year's Day, which subsided after a few minutes of rest. She also reported two separate instances of a burning sensation in her chest, reminiscent of heartburn, which she managed with her regular acid blockers and Tums. These symptoms were not associated with physical activity but occurred at rest, typically in the evening after a substantial meal. She has a known history of hypertension, currently well-managed with medication. She acknowledges significant stress related to her work and family life. Additionally, she reports persistent shortness of breath during exercise. She has a history of chest pain, for which she underwent a comprehensive workup approximately 6 years ago, yielding negative results.    She has identified recurrent lesions on her nose and back, which she would like to have evaluated by a dermatologist.    She suspects the development of trigger finger due to the presence of lumps in her palm and associated pain in her hand. She does not experience any locking of the finger but reports discomfort while typing, a task she performs frequently. She has previously noticed similar lumps along the tendon in her neck. 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    FAMILY HISTORY  Her father  at age 48. Her grandmother had a CABG.    MEDICATIONS  Current: acid blockers, Tums           Current Outpatient Medications Ordered in Epic   Medication Sig Dispense Refill    losartan (COZAAR) 50 MG Tab Take 1 Tablet by mouth every day. 90 Tablet 3    montelukast (SINGULAIR) 10 MG Tab Take 1 Tablet by mouth every day. 90 Tablet 3    estradiol (VIVELLE) 0.0375 MG/24HR patch  "1 PATCH TO CLEAN SKIN SEMI WEEKLY      fluticasone furoate-vilanterol (BREO ELLIPTA) 200-25 MCG/ACT AEROSOL POWDER, BREATH ACTIVATED Inhale 1 Puff every day. Rinse mouth after use. 1 Each 12    ibuprofen (MOTRIN) 200 MG Tab Take 800 mg by mouth every 6 hours as needed for Mild Pain.      esomeprazole (NEXIUM) 20 MG capsule Take 20 mg by mouth every evening.      albuterol 108 (90 Base) MCG/ACT Aero Soln inhalation aerosol Inhale 2 Puffs every four hours as needed for Shortness of Breath. 1 Each 5    cetirizine (ZYRTEC) 10 MG Tab Take 10 mg by mouth every evening.       No current Epic-ordered facility-administered medications on file.           ROS:  Review of Systems   Constitutional:  Negative for chills and fever.   Respiratory:  Negative for shortness of breath.    Cardiovascular:  Negative for chest pain.       Objective:     Exam:  /80 (BP Location: Right arm, Patient Position: Sitting, BP Cuff Size: Adult)   Pulse 80   Temp 36.1 °C (97 °F) (Temporal)   Resp 18   Ht 1.575 m (5' 2\")   Wt 71.9 kg (158 lb 8.2 oz)   SpO2 95%   BMI 28.99 kg/m²  Body mass index is 28.99 kg/m².    Physical Exam  Cardiovascular:      Rate and Rhythm: Normal rate and regular rhythm.      Pulses: Normal pulses.      Heart sounds:      No friction rub. No gallop.                   Assessment & Plan:     Assessment & Plan     Assessment & Plan  1. Atypical chest pain.  Given her family history of cardiac conditions and the fact that her last cardiac evaluation was conducted 6 years ago, it is prudent to rule out any potential heart-related issues. The most likely cause of her symptoms is acid reflux, exacerbated by strenuous activities such as climbing, which can lead to rapid and heavy breathing, potentially pushing stomach acid into the throat and causing inflammation. A stress test will be ordered to further evaluate her cardiac function.    2. Skin lesions.  She has a spot on her nose and one on her back that keeps " recurring. A referral to dermatology will be made for further evaluation and management.    3. Finger pain-chronic  She reports lumps in her palm and pain in her hand, which may indicate trigger finger. A referral to a hand surgeon will be made for further evaluation and management.    4. HTN-chronic  Continue losartan 50mg    Problem List Items Addressed This Visit       Essential hypertension    Relevant Medications    losartan (COZAAR) 50 MG Tab     Other Visit Diagnoses       Skin cancer screening        Relevant Orders    Referral to Dermatology    Pain of finger of left hand        Relevant Orders    Referral to Hand Surgery    Atypical chest pain        Relevant Orders    NM-CARDIAC TREADMILL ONLY-NO IMAGING                    Verbal consent was acquired by the patient to use [employer] ambient listening note generation during this visit Yes          Please note that this dictation was created using voice recognition software. I have made every reasonable attempt to correct obvious errors, but I expect that there are errors of grammar and possibly content that I did not discover before finalizing the note.

## 2025-02-06 ENCOUNTER — HOSPITAL ENCOUNTER (OUTPATIENT)
Dept: RADIOLOGY | Facility: MEDICAL CENTER | Age: 55
End: 2025-02-06
Attending: STUDENT IN AN ORGANIZED HEALTH CARE EDUCATION/TRAINING PROGRAM
Payer: COMMERCIAL

## 2025-02-06 DIAGNOSIS — R07.89 ATYPICAL CHEST PAIN: ICD-10-CM

## 2025-02-06 PROCEDURE — 93017 CV STRESS TEST TRACING ONLY: CPT | Mod: TC

## 2025-02-07 PROCEDURE — 93018 CV STRESS TEST I&R ONLY: CPT | Performed by: INTERNAL MEDICINE

## 2025-02-25 ENCOUNTER — OFFICE VISIT (OUTPATIENT)
Dept: DERMATOLOGY | Facility: IMAGING CENTER | Age: 55
End: 2025-02-25
Payer: COMMERCIAL

## 2025-02-25 DIAGNOSIS — L82.0 INFLAMED SEBORRHEIC KERATOSIS: ICD-10-CM

## 2025-02-25 DIAGNOSIS — L82.1 SEBORRHEIC KERATOSIS: ICD-10-CM

## 2025-02-25 DIAGNOSIS — Z12.83 SKIN CANCER SCREENING: ICD-10-CM

## 2025-02-25 DIAGNOSIS — D18.01 CHERRY ANGIOMA: ICD-10-CM

## 2025-02-25 DIAGNOSIS — D22.9 MULTIPLE NEVI: ICD-10-CM

## 2025-02-25 DIAGNOSIS — L57.8 OTHER SKIN CHANGES DUE TO CHRONIC EXPOSURE TO NONIONIZING RADIATION: ICD-10-CM

## 2025-02-25 DIAGNOSIS — L57.0 ACTINIC KERATOSIS: ICD-10-CM

## 2025-02-26 NOTE — PROGRESS NOTES
[facility]MAT[facility] NOTE    Chief Complaint   Patient presents with    [facility]        HPI:    Jennifer Benton is a 54 y.o. female here for evaluation of above, CHERRY.     Today notes following concern:   -  skin lesion on nose x 3 months       Skin lesion on back      No other symptomatic (itching, painful, burning) or changing lesions.       Dermatology History:      - Prior history of skin cancer: none      - Family history of skin cancer: father  type unknown       Review of Systems: No fevers, chill. Pertinent positives and negatives above.       Medications, Medical History, Surgical History, Family History & Allergies:  Reviewed in the chart, relevant history noted above.       PHYSICAL EXAM  Full body skin check of the scalp, face, neck, trunk, extremities was performed. Patient declined exam of groin and Patient with nail polish on  Skin type 2    - 3-5mm scattered pink gritty papule(s) on the nose, forehead  - tan to erythematous scaly stuck on papule on the back x2  - scattered melanotic macules and papules on the trunk and extremities  - scattered tan macules without surface scale on sun exposed areas of face, neck, upper chest, arms  - scattered tan waxy to gray stuck on papules and plaques on the trunk and extremities  - scattered 2-3mm red papules on trunk, extremities    ASSESSMENT & PLAN      # Actinic keratosis  Discussed these are pre-cancerous lesions, often arising on chronically sun exposed skin. If left untreated, these have the potential to evolve to a squamous cell cancer (~0.1% per lesion in lifetime).   CRYOTHERAPY:  Risks (including, but not limited to: hypo or hyperpigmentation, redness, blister, blood blister, recurrence, need for further treatment, infection, scar) and benefits of cryotherapy discussed. Patient verbally agreed to proceed with treatment. Cryotherapy freeze thaw cycles of 5-10 seconds were applied.  - LN2 x 2 lesion(s).   Patient tolerated procedure well.  Aftercare instructions given.      # Melanotic nevi  - clinically benign appearing today  - ABCDEs of atypical appearing moles discussed.     # Inflamed seborrheic keratosis  - reassured benign, but given lesion(s) symptomatic, treatment with cryotherapy discussed and patient amenable.  CRYOTHERAPY:  Risks (including, but not limited to: hypo or hyperpigmentation, redness, blister, blood blister, recurrence, need for further treatment, infection, scar) and benefits of cryotherapy discussed. Patient verbally agreed to proceed with treatment. Cryotherapy freeze thaw cycles of 5-10 seconds were applied.  - LN2 x 2 lesion(s).  Patient tolerated procedure well. Aftercare instructions given.        # Lentigines  - discussed this is a result of sun exposure, photodamage  - regular sun protective practices with hats/clothing, SPF 30+ with regular application and reapplication recommended    # Seborrheic keratosis  # Cherry angioma  - reassure benign, no treatment needed      Skin Cancer Prevention Counseling   Advise regular sun protection/sunscreen use, SPF 30 or greater, recommend mineral sunscreen, and to reapply every  minutes.   Recommend broad brimmed hats, UPF sun protective clothing when outdoors for extended periods of time   Recommend self checks at home,  ABCDEs of melanoma discussed       Return in about 1 year (around 2/25/2026) for Skin check.      Hailee Jerry MD  [facility]

## 2025-04-14 NOTE — PROGRESS NOTES
Subjective:     CC:  Diagnoses of Severe persistent asthma without complication and Encounter for preoperative pulmonary examination were pertinent to this visit.    HISTORY OF THE PRESENT ILLNESS: Patient is a 54 y.o. female. This pleasant patient is here today for rebecca-operative evaluation.Her PCP is Dr. Herb Reyes.  Her pulmonologist is Dr. Melissa Pearsoneva Rodriguez is a nonsmoker who is pending EGD with deep (Propofol) sedation on 5/16/25 for evaluation of esophageal dysphagia and chronic GERD.  Her underlying medical conditions including HTN, HLD, GERD, severe persistent asthma.  She was last seen by Dr. Murrell on 4/2/24 and was continued on Breo and albuterol since she had no exacerbations in six months.  Since her last pulmonary appointment, Jennifer has had no asthma exacerbations and has had no hospitalizations due to asthma exacerbation.  She has had no recent respiratory infection within the last month. She uses her albuterol inhaler less than 2 times per week.  She has not needed any night time albuterol use.  She does not have a recent CBC on file.  She had an EGD in 2006 and had no complications with the sedative or EGD. She had a colonoscopy a couple months ago with no complications.    Patient-specific risk factors for perioperative pulmonary complications:  1.  Age - 54  2.  Chronic obstructive pulmonary disease - no  3.  ASA class > 2 - Class 2- she has moderate severe asthma which is currently well controlled with no exacerbations in 1 year and no recent steroid use. She is able to walk up hills with no SOB or chest pain with her normal activities.    4.  Functional dependence- no limitations.  She is fully capable of doing her own ADL including: eating, bathing, dressing, using the toilet, transferring; she is also able to do all her own IADLS including managing finances, shopping, preparing meals, using transportation  5.  History of obstructive sleep apnea- possible Jennifer had a home  sleep study with her dentist that was suggestive of possible sleep apnea for which she is pending further evaluation with dentist.  She is at intermediate risk given that she snores, has HTN and is over 50 years old.  She has no abnormal daytime tiredness, no observed stopped breathing during sleep, no BMI greater than 35,   6.  Cigarette use within last 8 weeks- no  7.  FEV1 or peak expiratory flow less than 80% of predicted values of personal best- yes, FEV1 was 59% predicted on 4/24/23    Procedure related risk factors for perioperative pulmonary complications:  1.  Surgery > 3 hours - no  2.  Use of long-acting neuromuscular blockade- no  3.  Thoracic/neurosurgery- no  4.  Use of general anesthesia opposed to spinal/epidural- N/A      Allergies: Other food    Current Outpatient Medications Ordered in Epic   Medication Sig Dispense Refill    omeprazole (PRILOSEC) 40 MG delayed-release capsule 90 Capsules.      losartan (COZAAR) 50 MG Tab Take 1 Tablet by mouth every day. 90 Tablet 3    montelukast (SINGULAIR) 10 MG Tab Take 1 Tablet by mouth every day. 90 Tablet 3    estradiol (VIVELLE) 0.0375 MG/24HR patch 1 PATCH TO CLEAN SKIN SEMI WEEKLY      fluticasone furoate-vilanterol (BREO ELLIPTA) 200-25 MCG/ACT AEROSOL POWDER, BREATH ACTIVATED Inhale 1 Puff every day. Rinse mouth after use. 1 Each 12    ibuprofen (MOTRIN) 200 MG Tab Take 800 mg by mouth every 6 hours as needed for Mild Pain.      albuterol 108 (90 Base) MCG/ACT Aero Soln inhalation aerosol Inhale 2 Puffs every four hours as needed for Shortness of Breath. 1 Each 5    progesterone (PROMETRIUM) 100 MG Cap Take 100 mg by mouth at bedtime.      cetirizine (ZYRTEC ALLERGY) 10 MG Tab Take 10 mg by mouth every evening.       No current Epic-ordered facility-administered medications on file.       Past Medical History:   Diagnosis Date    GERD (gastroesophageal reflux disease)     Hyperlipidemia     Shortness of breath     Wheezing        Past Surgical  "History:   Procedure Laterality Date    UMBILICAL HERNIA REPAIR         Social History     Tobacco Use    Smoking status: Never    Smokeless tobacco: Never   Vaping Use    Vaping status: Never Used   Substance Use Topics    Alcohol use: Yes     Comment: occ    Drug use: Never       Social History     Social History Narrative    Not on file       Family History   Problem Relation Age of Onset    Cancer Mother         ductal breast cancer    Cancer Father         skin cancer    Heart Disease Father 48    Lung Disease Maternal Grandfather         severe emphysema    Diabetes Son         type 1       Health Maintenance: COVID19 booster recommended at least 2 weeks prior to procedure    ROS:   Gen: no fevers/chills, no changes in weight  Eyes: no recent changes in vision  ENT: no sore throat, no hearing loss, no bloody nose  Pulm: no sob, no cough, no wheezing  CV: no chest pain, no palpitations  GI: no nausea/vomiting, no diarrhea  : no dysuria  MSk: no myalgias  Skin: no rash  Neuro: no headaches, no numbness/tingling  Heme/Lymph: no easy bruising      Objective:       Exam: /70 (BP Location: Right arm, Patient Position: Sitting, BP Cuff Size: Adult)   Pulse 100   Resp 16   Ht 1.575 m (5' 2\")   Wt 71.7 kg (158 lb)   SpO2 96%  Body mass index is 28.9 kg/m².    General: Normal appearing. No distress.  HEENT: Normocephalic.  Canals are clear bilaterally, tympanic membranes are benign, nasal mucosa benign, oropharynx is without erythema, edema or exudates.   Eyes: Eyes conjunctiva clear lids without ptosis, pupils equal and reactive to light accommodation, ears normal shape and contour  Neck: Supple without bruit. Thyroid is not enlarged.  Pulmonary: Clear to ausculation.  Normal effort. No rales, ronchi, or wheezing.  Cardiovascular: Regular rate and rhythm without murmur.   Abdomen: Soft, nontender, nondistended. Normal bowel sounds. Liver and spleen are not palpable  Neurologic: No gross motor " deficits  Lymph: No cervical or supraclavicular lymph nodes are palpable  Skin: Warm and dry.  No obvious lesions.  Musculoskeletal: Normal gait. No extremity cyanosis, clubbing, or edema.  Psych: Normal mood and affect. Alert and oriented x3. Judgment and insight is normal.      Assessment & Plan:   54 y.o. female with the following -      1. Severe persistent asthma without complication  Chronic, well controlled.  Perioperative recommendations given below.  She is to follow-up with her pulmonologist for continued monitoring and evaluation.    2. Encounter for preoperative pulmonary examination  We discussed that the goal of a perioperative pulmonary evaluation is to identify patients at high risk of significant postoperative pulmonary complications, so appropriate interventions can be provided to minimize that risk.  We discussed that complications can arise from anesthesia, the surgery itself, and underlying respiratory conditions.      ARISCAT index:   3-14 points (depending on Hgb level) = low risk with a 1.6% risk of in-hospital post-op pulmonary complications.    Cummings post operative respiratory failure risk score 1.6%    Recommendation: Jennifer is recommended to have the EGD procedure at a [facility] outpatient procedure center.    Considerations to minimize perioperative pulmonary complications:  1. Preoperative CBC for evaluate for preoperative anemia  2.  Preoperative chest physical therapy including aerobic exercises breathing exercises and inspiratory muscle training reduces postoperative pulmonary complications.  Lung expansion maneuvers including incentive spirometry at this time prior to surgery.  3.  Avoid long acting neuromuscular blockade intraoperatively if possible  4.  Minimize length of surgery as much as possible  5.  Encourage lung protective ventilation intraoperatively, goal tidal volumes of 6-8cc/kg IBW.  6.  Encourage incentive spirometry and voluntary deep breaths and early mobilization when  deemed safe from a surgical perspective.    With the above evaluation and considerations in mind, the patient is optimized from a pulmonary perspective for a non-emergent, time-sensitive elective surgery.  - CBC WITH DIFFERENTIAL; Future    Addendum: 12:41pm Hgb 15.2.  ARISCAT index score 3 = low risk. -Dr. Lisa Lindsey    Addendum 4/23/25 Called and updated Jennifer that given her bloodwork and low Cummings and ARISCAT scores, my recommendation is to have the EGD at the [facility] outpatient procedure center.  Risks discussed.  All questions and concerns answered to her satisfaction. -Dr. Lisa Lindsey    Return for routine pulmonary follow-up as soon as possible.    Please note that this dictation was created using voice recognition software. I have made every reasonable attempt to correct obvious errors, but I expect that there are errors of grammar and possibly content that I did not discover before finalizing the note.

## 2025-04-16 ENCOUNTER — HOSPITAL ENCOUNTER (OUTPATIENT)
Dept: LAB | Facility: MEDICAL CENTER | Age: 55
End: 2025-04-16
Attending: FAMILY MEDICINE
Payer: COMMERCIAL

## 2025-04-16 ENCOUNTER — HOSPITAL ENCOUNTER (OUTPATIENT)
Facility: MEDICAL CENTER | Age: 55
End: 2025-04-16
Attending: STUDENT IN AN ORGANIZED HEALTH CARE EDUCATION/TRAINING PROGRAM
Payer: COMMERCIAL

## 2025-04-16 ENCOUNTER — OFFICE VISIT (OUTPATIENT)
Dept: SLEEP MEDICINE | Facility: MEDICAL CENTER | Age: 55
End: 2025-04-16
Attending: FAMILY MEDICINE
Payer: COMMERCIAL

## 2025-04-16 ENCOUNTER — RESULTS FOLLOW-UP (OUTPATIENT)
Dept: SLEEP MEDICINE | Facility: MEDICAL CENTER | Age: 55
End: 2025-04-16

## 2025-04-16 VITALS
HEART RATE: 100 BPM | BODY MASS INDEX: 29.08 KG/M2 | OXYGEN SATURATION: 96 % | WEIGHT: 158 LBS | RESPIRATION RATE: 16 BRPM | HEIGHT: 62 IN | SYSTOLIC BLOOD PRESSURE: 120 MMHG | DIASTOLIC BLOOD PRESSURE: 70 MMHG

## 2025-04-16 DIAGNOSIS — Z01.811 ENCOUNTER FOR PREOPERATIVE PULMONARY EXAMINATION: ICD-10-CM

## 2025-04-16 DIAGNOSIS — J45.50 SEVERE PERSISTENT ASTHMA WITHOUT COMPLICATION: ICD-10-CM

## 2025-04-16 DIAGNOSIS — E78.5 DYSLIPIDEMIA: ICD-10-CM

## 2025-04-16 LAB
BASOPHILS # BLD AUTO: 1 % (ref 0–1.8)
BASOPHILS # BLD: 0.08 K/UL (ref 0–0.12)
EOSINOPHIL # BLD AUTO: 0.35 K/UL (ref 0–0.51)
EOSINOPHIL NFR BLD: 4.4 % (ref 0–6.9)
ERYTHROCYTE [DISTWIDTH] IN BLOOD BY AUTOMATED COUNT: 45.1 FL (ref 35.9–50)
HCT VFR BLD AUTO: 45.4 % (ref 37–47)
HGB BLD-MCNC: 15.2 G/DL (ref 12–16)
IMM GRANULOCYTES # BLD AUTO: 0.06 K/UL (ref 0–0.11)
IMM GRANULOCYTES NFR BLD AUTO: 0.7 % (ref 0–0.9)
LYMPHOCYTES # BLD AUTO: 2.43 K/UL (ref 1–4.8)
LYMPHOCYTES NFR BLD: 30.2 % (ref 22–41)
MCH RBC QN AUTO: 31.3 PG (ref 27–33)
MCHC RBC AUTO-ENTMCNC: 33.5 G/DL (ref 32.2–35.5)
MCV RBC AUTO: 93.6 FL (ref 81.4–97.8)
MONOCYTES # BLD AUTO: 0.74 K/UL (ref 0–0.85)
MONOCYTES NFR BLD AUTO: 9.2 % (ref 0–13.4)
NEUTROPHILS # BLD AUTO: 4.38 K/UL (ref 1.82–7.42)
NEUTROPHILS NFR BLD: 54.5 % (ref 44–72)
NRBC # BLD AUTO: 0 K/UL
NRBC BLD-RTO: 0 /100 WBC (ref 0–0.2)
PLATELET # BLD AUTO: 391 K/UL (ref 164–446)
PMV BLD AUTO: 8.8 FL (ref 9–12.9)
RBC # BLD AUTO: 4.85 M/UL (ref 4.2–5.4)
WBC # BLD AUTO: 8 K/UL (ref 4.8–10.8)

## 2025-04-16 PROCEDURE — 80061 LIPID PANEL: CPT

## 2025-04-16 PROCEDURE — 82172 ASSAY OF APOLIPOPROTEIN: CPT

## 2025-04-16 PROCEDURE — 83704 LIPOPROTEIN BLD QUAN PART: CPT

## 2025-04-16 PROCEDURE — 99214 OFFICE O/P EST MOD 30 MIN: CPT | Performed by: FAMILY MEDICINE

## 2025-04-16 PROCEDURE — 85025 COMPLETE CBC W/AUTO DIFF WBC: CPT

## 2025-04-16 PROCEDURE — 36415 COLL VENOUS BLD VENIPUNCTURE: CPT

## 2025-04-16 PROCEDURE — 99212 OFFICE O/P EST SF 10 MIN: CPT | Performed by: FAMILY MEDICINE

## 2025-04-16 RX ORDER — OMEPRAZOLE 40 MG/1
90 CAPSULE, DELAYED RELEASE ORAL
COMMUNITY
Start: 2025-03-20

## 2025-04-16 RX ORDER — CETIRIZINE HYDROCHLORIDE 10 MG/1
10 TABLET ORAL NIGHTLY
COMMUNITY

## 2025-04-16 RX ORDER — PROGESTERONE 100 MG/1
100 CAPSULE ORAL
COMMUNITY

## 2025-04-18 LAB — APO B100 SERPL-MCNC: 125 MG/DL (ref 60–117)

## 2025-04-21 LAB
CHOLEST SERPL-MCNC: 240 MG/DL
HDL PARTICAL NO Q4363: >41 UMOL/L
HDL SIZE Q4361: 8.1 NM
HDLC SERPL-MCNC: 52 MG/DL (ref 40–59)
HLD.LARGE SERPL-SCNC: <2.8 UMOL/L
L VLDL PART NO Q4357: 3.2 NMOL/L
LDL SERPL QN: 20.7 NM
LDL SERPL-SCNC: 2044 NMOL/L
LDL SMALL SERPL-SCNC: 883 NMOL/L
LDLC SERPL CALC-MCNC: 163 MG/DL
PATHOLOGY STUDY: ABNORMAL
TRIGL SERPL-MCNC: 127 MG/DL (ref 30–149)
VLDL SIZE Q4362: 48.4 NM

## 2025-04-22 ENCOUNTER — OFFICE VISIT (OUTPATIENT)
Dept: MEDICAL GROUP | Facility: LAB | Age: 55
End: 2025-04-22
Payer: COMMERCIAL

## 2025-04-22 VITALS
BODY MASS INDEX: 29.26 KG/M2 | RESPIRATION RATE: 18 BRPM | TEMPERATURE: 98 F | HEART RATE: 88 BPM | DIASTOLIC BLOOD PRESSURE: 68 MMHG | WEIGHT: 159 LBS | OXYGEN SATURATION: 94 % | SYSTOLIC BLOOD PRESSURE: 118 MMHG | HEIGHT: 62 IN

## 2025-04-22 DIAGNOSIS — Z00.00 ANNUAL PHYSICAL EXAM: ICD-10-CM

## 2025-04-22 DIAGNOSIS — Z12.31 ENCOUNTER FOR SCREENING MAMMOGRAM FOR MALIGNANT NEOPLASM OF BREAST: ICD-10-CM

## 2025-04-22 DIAGNOSIS — E78.5 DYSLIPIDEMIA: ICD-10-CM

## 2025-04-22 DIAGNOSIS — J45.50 SEVERE PERSISTENT ASTHMA WITHOUT COMPLICATION: ICD-10-CM

## 2025-04-22 PROCEDURE — 99396 PREV VISIT EST AGE 40-64: CPT | Performed by: STUDENT IN AN ORGANIZED HEALTH CARE EDUCATION/TRAINING PROGRAM

## 2025-04-22 PROCEDURE — 3074F SYST BP LT 130 MM HG: CPT | Performed by: STUDENT IN AN ORGANIZED HEALTH CARE EDUCATION/TRAINING PROGRAM

## 2025-04-22 PROCEDURE — 99212 OFFICE O/P EST SF 10 MIN: CPT | Mod: 25 | Performed by: STUDENT IN AN ORGANIZED HEALTH CARE EDUCATION/TRAINING PROGRAM

## 2025-04-22 PROCEDURE — 3078F DIAST BP <80 MM HG: CPT | Performed by: STUDENT IN AN ORGANIZED HEALTH CARE EDUCATION/TRAINING PROGRAM

## 2025-04-22 RX ORDER — ALBUTEROL SULFATE 90 UG/1
2 INHALANT RESPIRATORY (INHALATION) EVERY 4 HOURS PRN
Qty: 1 EACH | Refills: 5 | Status: SHIPPED | OUTPATIENT
Start: 2025-04-22

## 2025-04-22 RX ORDER — ROSUVASTATIN CALCIUM 5 MG/1
5 TABLET, COATED ORAL EVERY EVENING
Qty: 90 TABLET | Refills: 3 | Status: SHIPPED | OUTPATIENT
Start: 2025-04-22

## 2025-04-22 ASSESSMENT — ENCOUNTER SYMPTOMS
CHILLS: 0
FEVER: 0
SHORTNESS OF BREATH: 0

## 2025-04-22 NOTE — PROGRESS NOTES
Subjective:     CC:   Chief Complaint   Patient presents with   • Annual Exam       HPI:   Jennifer Benton is a 54 y.o. female who presents for annual exam    Patient has GYN provider: Yes  Last Pap Smear: patient unable to recall    H/O Abnormal Pap: No  Last Mammogram: 2021  Last Bone Density Test: NA  Last Colorectal Cancer Screening: NA  Last Tdap: 2019  Received HPV series: No     Exercise: walking, no weight bearing   Diet: balanced diet      No LMP recorded. Patient is postmenopausal.  She has not utilized hormone replacement therapy.  Reports mood changes.  No significant bloating/fluid retention, pelvic pain, or dyspareunia. No abnormal vaginal discharge.   No breast tenderness, mass, nipple discharge or changes in size or contour.    OB History   No obstetric history on file.      She  reports being sexually active.    She  has a past medical history of GERD (gastroesophageal reflux disease), Hyperlipidemia, Shortness of breath, and Wheezing.    She has no past medical history of Painful breathing.  She  has a past surgical history that includes umbilical hernia repair.    Family History   Problem Relation Age of Onset   • Cancer Mother         ductal breast cancer   • Cancer Father         skin cancer   • Heart Disease Father 48   • Lung Disease Maternal Grandfather         severe emphysema   • Diabetes Son         type 1     Social History     Tobacco Use   • Smoking status: Never   • Smokeless tobacco: Never   Vaping Use   • Vaping status: Never Used   Substance Use Topics   • Alcohol use: Yes     Comment: occ   • Drug use: Never       Patient Active Problem List    Diagnosis Date Noted   • Severe persistent asthma without complication 04/12/2024   • Acute non-recurrent pansinusitis 02/24/2023   • Pulmonary nodules 10/05/2022   • Dyslipidemia 06/14/2022   • Family history of early CAD 11/17/2021   • Anxiety and depression 11/11/2021   • Chronic pansinusitis 10/11/2021   • Acquired deformity of nail  "03/02/2021   • Essential hypertension 12/10/2020   • Gastroesophageal reflux disease without esophagitis 10/23/2019   • Severe persistent asthma with acute exacerbation 10/23/2019     Current Outpatient Medications   Medication Sig Dispense Refill   • albuterol 108 (90 Base) MCG/ACT Aero Soln inhalation aerosol Inhale 2 Puffs every four hours as needed for Shortness of Breath. 1 Each 5   • rosuvastatin (CRESTOR) 5 MG Tab Take 1 Tablet by mouth every evening. 90 Tablet 3   • omeprazole (PRILOSEC) 40 MG delayed-release capsule 90 Capsules.     • progesterone (PROMETRIUM) 100 MG Cap Take 100 mg by mouth at bedtime.     • cetirizine (ZYRTEC ALLERGY) 10 MG Tab Take 10 mg by mouth every evening.     • losartan (COZAAR) 50 MG Tab Take 1 Tablet by mouth every day. 90 Tablet 3   • montelukast (SINGULAIR) 10 MG Tab Take 1 Tablet by mouth every day. 90 Tablet 3   • estradiol (VIVELLE) 0.0375 MG/24HR patch 1 PATCH TO CLEAN SKIN SEMI WEEKLY     • fluticasone furoate-vilanterol (BREO ELLIPTA) 200-25 MCG/ACT AEROSOL POWDER, BREATH ACTIVATED Inhale 1 Puff every day. Rinse mouth after use. 1 Each 12   • ibuprofen (MOTRIN) 200 MG Tab Take 800 mg by mouth every 6 hours as needed for Mild Pain.       No current facility-administered medications for this visit.     Allergies   Allergen Reactions   • Other Food      Walnuts, pt reports that her tongue burns          Review of Systems   Review of Systems   Constitutional:  Negative for chills and fever.   Respiratory:  Negative for shortness of breath.    Cardiovascular:  Negative for chest pain.         Objective:   /68 (BP Location: Right arm, Patient Position: Sitting, BP Cuff Size: Adult)   Pulse 88   Temp 36.7 °C (98 °F) (Temporal)   Resp 18   Ht 1.575 m (5' 2\")   Wt 72.1 kg (159 lb)   SpO2 94%   BMI 29.08 kg/m²     Wt Readings from Last 4 Encounters:   04/22/25 72.1 kg (159 lb)   04/16/25 71.7 kg (158 lb)   02/19/25 71.8 kg (158 lb 4.8 oz)   01/28/25 71.9 kg (158 lb 8.2 " oz)           Physical Exam:  Physical Exam  Constitutional:       General: She is not in acute distress.     Appearance: She is not ill-appearing.   Pulmonary:      Effort: Pulmonary effort is normal.   Neurological:      Mental Status: She is alert.   Psychiatric:         Mood and Affect: Mood normal.         Behavior: Behavior normal.         Thought Content: Thought content normal.         Judgment: Judgment normal.         Assessment and Plan:     1. Severe persistent asthma without complication  - albuterol 108 (90 Base) MCG/ACT Aero Soln inhalation aerosol; Inhale 2 Puffs every four hours as needed for Shortness of Breath.  Dispense: 1 Each; Refill: 5    2. Dyslipidemia  - rosuvastatin (CRESTOR) 5 MG Tab; Take 1 Tablet by mouth every evening.  Dispense: 90 Tablet; Refill: 3    3. Annual physical exam  - HEMOGLOBIN A1C; Future  - CBC WITH DIFFERENTIAL; Future  - Comp Metabolic Panel; Future  - TSH WITH REFLEX TO FT4; Future  - Lipid Profile; Future  - VITAMIN D,25 HYDROXY (DEFICIENCY); Future    4. Encounter for screening mammogram for malignant neoplasm of breast  - MA-SCREENING MAMMO BILAT W/TOMOSYNTHESIS W/CAD; Future      Health maintenance:    Labs per orders  Immunizations per orders  Age-appropriate guidance discussed including diet and exercise  Discussed  mammography screening     Follow-up: 6 month lab follow up

## 2025-06-02 ENCOUNTER — APPOINTMENT (OUTPATIENT)
Dept: RADIOLOGY | Facility: MEDICAL CENTER | Age: 55
End: 2025-06-02
Attending: NURSE PRACTITIONER
Payer: COMMERCIAL

## 2025-06-14 DIAGNOSIS — J45.40 MODERATE PERSISTENT ASTHMA WITHOUT COMPLICATION: ICD-10-CM

## 2025-06-16 RX ORDER — MONTELUKAST SODIUM 10 MG/1
10 TABLET ORAL DAILY
Qty: 90 TABLET | Refills: 3 | Status: SHIPPED | OUTPATIENT
Start: 2025-06-16

## 2025-06-18 NOTE — ASSESSMENT & PLAN NOTE
Pt has not had an exacerbation in the last 6 months.  In reviewing Biologics and benefits of Biologics reviewed it is really to prevent exacerbations and steroid use as well as ER visits and hospitalizations.  As she has been stable over the last 6 months this is very reassuring and so would like to reevaluate in 3 months.  So at this time Biologics not recommended.  Continue Breo and albuterol  Follow-up in 3 months   [Dear  ___] : Dear  [unfilled], [Consult Letter:] : I had the pleasure of evaluating your patient, [unfilled]. [Please see my note below.] : Please see my note below. [Consult Closing:] : Thank you very much for allowing me to participate in the care of this patient.  If you have any questions, please do not hesitate to contact me. [Sincerely,] : Sincerely, [[email or web address]] : Adebayo Verdin MD Co-Director [facility]

## 2025-06-26 RX ORDER — PROGESTERONE 100 MG/1
100 CAPSULE ORAL
Qty: 90 CAPSULE | Refills: 2 | Status: SHIPPED | OUTPATIENT
Start: 2025-06-26

## 2025-06-26 RX ORDER — ESTRADIOL 0.04 MG/D
PATCH, EXTENDED RELEASE TRANSDERMAL
Qty: 12 PATCH | Refills: 2 | Status: SHIPPED | OUTPATIENT
Start: 2025-06-26

## 2025-07-28 ENCOUNTER — HOSPITAL ENCOUNTER (OUTPATIENT)
Dept: RADIOLOGY | Facility: MEDICAL CENTER | Age: 55
End: 2025-07-28
Attending: NURSE PRACTITIONER
Payer: COMMERCIAL

## 2025-07-28 DIAGNOSIS — R13.19 ESOPHAGEAL DYSPHAGIA: ICD-10-CM

## 2025-07-28 DIAGNOSIS — K21.9 GASTRIC REFLUX SYNDROME: ICD-10-CM

## 2025-07-28 PROCEDURE — 700117 HCHG RX CONTRAST REV CODE 255: Performed by: NURSE PRACTITIONER

## 2025-07-28 PROCEDURE — 74220 X-RAY XM ESOPHAGUS 1CNTRST: CPT

## 2025-07-28 RX ADMIN — BARIUM SULFATE 700 MG: 700 TABLET ORAL at 16:01

## 2025-07-31 ENCOUNTER — OFFICE VISIT (OUTPATIENT)
Dept: MEDICAL GROUP | Facility: LAB | Age: 55
End: 2025-07-31
Payer: COMMERCIAL

## 2025-07-31 VITALS
RESPIRATION RATE: 18 BRPM | DIASTOLIC BLOOD PRESSURE: 72 MMHG | HEART RATE: 78 BPM | SYSTOLIC BLOOD PRESSURE: 122 MMHG | BODY MASS INDEX: 29.53 KG/M2 | OXYGEN SATURATION: 96 % | WEIGHT: 160.5 LBS | HEIGHT: 62 IN | TEMPERATURE: 74 F

## 2025-07-31 DIAGNOSIS — F41.9 ANXIETY AND DEPRESSION: Primary | ICD-10-CM

## 2025-07-31 DIAGNOSIS — F32.A ANXIETY AND DEPRESSION: Primary | ICD-10-CM

## 2025-07-31 ASSESSMENT — ENCOUNTER SYMPTOMS
SHORTNESS OF BREATH: 0
FEVER: 0
CHILLS: 0

## 2025-07-31 NOTE — PROGRESS NOTES
"Subjective:     CC:   Chief Complaint   Patient presents with    Paperwork        HPI:   History of Present Illness  The patient presents for evaluation of anxiety.    Anxiety  - Her anxiety has been exacerbated due to a recent change in her work environment.  - She was previously working from home but has now returned to the office where several departments have been merged into one space.  - Her desk is located near a frequently used door, which she finds particularly stressful.  - This situation is causing her significant emotional distress, which she attributes to menopause, anxiety, and depression.  - The noise levels in her office are so high that they disrupt her focus and prevent her from performing detailed work.  - She estimates that these disturbances occur more than 50 times per hour, making it difficult for her to work without distraction.    Medication  - She has discontinued the use of sertraline.  - She is currently on estrogen and progesterone therapy, which she reports has been beneficial.    Social History  - Occupations:        Problem   Anxiety and Depression       Current Medications and Prescriptions Ordered in Epic[1]        ROS:  Review of Systems   Constitutional:  Negative for chills and fever.   Respiratory:  Negative for shortness of breath.    Cardiovascular:  Negative for chest pain.       Objective:     Exam:  /72 (BP Location: Right arm, Patient Position: Sitting, BP Cuff Size: Adult)   Pulse 78   Temp (!) 23.3 °C (74 °F) (Temporal)   Resp 18   Ht 1.575 m (5' 2\")   Wt 72.8 kg (160 lb 7.9 oz)   SpO2 96%   BMI 29.35 kg/m²  Body mass index is 29.35 kg/m².    Physical Exam  Constitutional:       General: She is not in acute distress.     Appearance: She is not ill-appearing.   Pulmonary:      Effort: Pulmonary effort is normal.   Neurological:      Mental Status: She is alert.   Psychiatric:         Mood and Affect: Mood normal.         Behavior: Behavior " normal.         Thought Content: Thought content normal.         Judgment: Judgment normal.             Assessment & Plan:     Assessment & Plan  1. Anxiety: Exacerbated.  - Anxiety exacerbated by current work environment, particularly proximity to a frequently used door.  - Significant emotional stress and disruption in ability to focus on detailed tasks.  - Not currently taking sertraline; prescribed estrogen and progesterone have helped alleviate some symptoms.  - Recommended relocation of workstation to a quieter area.        Problem List Items Addressed This Visit       Anxiety and depression - Primary     21 minutes for chart review, patient interaction, documentation of this encounter, and paperwork completion      Please note that this dictation was created using voice recognition software. I have made every reasonable attempt to correct obvious errors, but I expect that there are errors of grammar and possibly content that I did not discover before finalizing the note.          [1]   Current Outpatient Medications Ordered in Epic   Medication Sig Dispense Refill    progesterone (PROMETRIUM) 100 MG Cap Take 1 Capsule by mouth at bedtime. 90 Capsule 2    estradiol (VIVELLE) 0.0375 MG/24HR patch 1 PATCH TO CLEAN SKIN SEMI WEEKLY 12 Patch 2    montelukast (SINGULAIR) 10 MG Tab TAKE 1 TABLET BY MOUTH EVERY DAY 90 Tablet 3    albuterol 108 (90 Base) MCG/ACT Aero Soln inhalation aerosol Inhale 2 Puffs every four hours as needed for Shortness of Breath. 1 Each 5    rosuvastatin (CRESTOR) 5 MG Tab Take 1 Tablet by mouth every evening. 90 Tablet 3    omeprazole (PRILOSEC) 40 MG delayed-release capsule 90 Capsules.      cetirizine (ZYRTEC ALLERGY) 10 MG Tab Take 10 mg by mouth every evening.      losartan (COZAAR) 50 MG Tab Take 1 Tablet by mouth every day. 90 Tablet 3    fluticasone furoate-vilanterol (BREO ELLIPTA) 200-25 MCG/ACT AEROSOL POWDER, BREATH ACTIVATED Inhale 1 Puff every day. Rinse mouth after use. 1 Each  12    ibuprofen (MOTRIN) 200 MG Tab Take 800 mg by mouth every 6 hours as needed for Mild Pain.       No current Epic-ordered facility-administered medications on file.